# Patient Record
Sex: FEMALE | Race: WHITE | NOT HISPANIC OR LATINO | URBAN - METROPOLITAN AREA
[De-identification: names, ages, dates, MRNs, and addresses within clinical notes are randomized per-mention and may not be internally consistent; named-entity substitution may affect disease eponyms.]

---

## 2023-11-16 DIAGNOSIS — M16.11 PRIMARY OSTEOARTHRITIS OF RIGHT HIP: ICD-10-CM

## 2023-11-16 RX ORDER — MELOXICAM 15 MG/1
15 TABLET ORAL DAILY PRN
COMMUNITY
Start: 2023-08-29 | End: 2023-11-16 | Stop reason: SDUPTHER

## 2023-11-16 RX ORDER — MELOXICAM 15 MG/1
15 TABLET ORAL DAILY PRN
Qty: 90 TABLET | Refills: 1 | Status: SHIPPED | OUTPATIENT
Start: 2023-11-16

## 2023-11-16 RX ORDER — ACETAMINOPHEN, DIPHENHYDRAMINE HCL, PHENYLEPHRINE HCL 325; 25; 5 MG/1; MG/1; MG/1
10 TABLET ORAL NIGHTLY
COMMUNITY

## 2023-11-16 RX ORDER — FLUTICASONE PROPIONATE 50 MCG
1 SPRAY, SUSPENSION (ML) NASAL NIGHTLY
COMMUNITY
Start: 2023-07-25

## 2025-01-08 ENCOUNTER — PREP FOR PROCEDURE (OUTPATIENT)
Dept: ORTHOPEDIC SURGERY | Facility: HOSPITAL | Age: 60
End: 2025-01-08
Payer: COMMERCIAL

## 2025-01-08 DIAGNOSIS — M16.11 PRIMARY OSTEOARTHRITIS OF RIGHT HIP: Primary | ICD-10-CM

## 2025-01-20 ENCOUNTER — PRE-ADMISSION TESTING (OUTPATIENT)
Dept: PREADMISSION TESTING | Facility: HOSPITAL | Age: 60
End: 2025-01-20
Payer: COMMERCIAL

## 2025-01-20 ENCOUNTER — EDUCATION (OUTPATIENT)
Dept: ORTHOPEDIC SURGERY | Facility: HOSPITAL | Age: 60
End: 2025-01-20
Payer: COMMERCIAL

## 2025-01-20 VITALS
SYSTOLIC BLOOD PRESSURE: 135 MMHG | TEMPERATURE: 98.2 F | HEART RATE: 74 BPM | BODY MASS INDEX: 36.14 KG/M2 | WEIGHT: 224.9 LBS | DIASTOLIC BLOOD PRESSURE: 91 MMHG | RESPIRATION RATE: 18 BRPM | HEIGHT: 66 IN | OXYGEN SATURATION: 97 %

## 2025-01-20 DIAGNOSIS — M16.11 PRIMARY OSTEOARTHRITIS OF RIGHT HIP: ICD-10-CM

## 2025-01-20 DIAGNOSIS — Z01.818 PREOP TESTING: Primary | ICD-10-CM

## 2025-01-20 LAB
ALBUMIN SERPL BCP-MCNC: 4.5 G/DL (ref 3.4–5)
ALP SERPL-CCNC: 97 U/L (ref 33–110)
ALT SERPL W P-5'-P-CCNC: 14 U/L (ref 7–45)
ANION GAP SERPL CALC-SCNC: 12 MMOL/L (ref 10–20)
AST SERPL W P-5'-P-CCNC: 17 U/L (ref 9–39)
BASOPHILS # BLD AUTO: 0.08 X10*3/UL (ref 0–0.1)
BASOPHILS NFR BLD AUTO: 1 %
BILIRUB SERPL-MCNC: 0.4 MG/DL (ref 0–1.2)
BUN SERPL-MCNC: 21 MG/DL (ref 6–23)
CALCIUM SERPL-MCNC: 9.8 MG/DL (ref 8.6–10.3)
CHLORIDE SERPL-SCNC: 103 MMOL/L (ref 98–107)
CO2 SERPL-SCNC: 29 MMOL/L (ref 21–32)
CREAT SERPL-MCNC: 0.75 MG/DL (ref 0.5–1.05)
EGFRCR SERPLBLD CKD-EPI 2021: >90 ML/MIN/1.73M*2
EOSINOPHIL # BLD AUTO: 0.34 X10*3/UL (ref 0–0.7)
EOSINOPHIL NFR BLD AUTO: 4.3 %
ERYTHROCYTE [DISTWIDTH] IN BLOOD BY AUTOMATED COUNT: 12.2 % (ref 11.5–14.5)
EST. AVERAGE GLUCOSE BLD GHB EST-MCNC: 97 MG/DL
GLUCOSE SERPL-MCNC: 84 MG/DL (ref 74–99)
HBA1C MFR BLD: 5 %
HCT VFR BLD AUTO: 41.6 % (ref 36–46)
HGB BLD-MCNC: 13.4 G/DL (ref 12–16)
IMM GRANULOCYTES # BLD AUTO: 0.01 X10*3/UL (ref 0–0.7)
IMM GRANULOCYTES NFR BLD AUTO: 0.1 % (ref 0–0.9)
LYMPHOCYTES # BLD AUTO: 2.07 X10*3/UL (ref 1.2–4.8)
LYMPHOCYTES NFR BLD AUTO: 25.9 %
MCH RBC QN AUTO: 29.5 PG (ref 26–34)
MCHC RBC AUTO-ENTMCNC: 32.2 G/DL (ref 32–36)
MCV RBC AUTO: 91 FL (ref 80–100)
MONOCYTES # BLD AUTO: 0.63 X10*3/UL (ref 0.1–1)
MONOCYTES NFR BLD AUTO: 7.9 %
NEUTROPHILS # BLD AUTO: 4.85 X10*3/UL (ref 1.2–7.7)
NEUTROPHILS NFR BLD AUTO: 60.8 %
NRBC BLD-RTO: NORMAL /100{WBCS}
PLATELET # BLD AUTO: 418 X10*3/UL (ref 150–450)
POTASSIUM SERPL-SCNC: 3.7 MMOL/L (ref 3.5–5.3)
PROT SERPL-MCNC: 7.3 G/DL (ref 6.4–8.2)
RBC # BLD AUTO: 4.55 X10*6/UL (ref 4–5.2)
SODIUM SERPL-SCNC: 140 MMOL/L (ref 136–145)
WBC # BLD AUTO: 8 X10*3/UL (ref 4.4–11.3)

## 2025-01-20 PROCEDURE — 93005 ELECTROCARDIOGRAM TRACING: CPT

## 2025-01-20 PROCEDURE — 99204 OFFICE O/P NEW MOD 45 MIN: CPT | Performed by: NURSE PRACTITIONER

## 2025-01-20 PROCEDURE — 36415 COLL VENOUS BLD VENIPUNCTURE: CPT

## 2025-01-20 PROCEDURE — 87081 CULTURE SCREEN ONLY: CPT | Mod: BEALAB

## 2025-01-20 PROCEDURE — 85025 COMPLETE CBC W/AUTO DIFF WBC: CPT

## 2025-01-20 PROCEDURE — 80053 COMPREHEN METABOLIC PANEL: CPT

## 2025-01-20 PROCEDURE — 83036 HEMOGLOBIN GLYCOSYLATED A1C: CPT | Mod: BEALAB

## 2025-01-20 PROCEDURE — 93010 ELECTROCARDIOGRAM REPORT: CPT | Performed by: INTERNAL MEDICINE

## 2025-01-20 RX ORDER — FERROUS SULFATE 325(65) MG
325 TABLET ORAL
COMMUNITY

## 2025-01-20 RX ORDER — MULTIVITAMIN/IRON/FOLIC ACID 18MG-0.4MG
1 TABLET ORAL DAILY
COMMUNITY

## 2025-01-20 RX ORDER — DEXTROMETHORPHAN HYDROBROMIDE, GUAIFENESIN 5; 100 MG/5ML; MG/5ML
650 LIQUID ORAL EVERY 8 HOURS PRN
COMMUNITY

## 2025-01-20 RX ORDER — IBUPROFEN 200 MG
200 TABLET ORAL EVERY 6 HOURS PRN
COMMUNITY

## 2025-01-20 RX ORDER — CHLORHEXIDINE GLUCONATE 40 MG/ML
SOLUTION TOPICAL DAILY
Qty: 470 ML | Refills: 0 | Status: SHIPPED | OUTPATIENT
Start: 2025-01-20 | End: 2025-01-25

## 2025-01-20 RX ORDER — CHLORHEXIDINE GLUCONATE ORAL RINSE 1.2 MG/ML
15 SOLUTION DENTAL DAILY
Qty: 30 ML | Refills: 0 | Status: SHIPPED | OUTPATIENT
Start: 2025-01-20 | End: 2025-01-22

## 2025-01-20 ASSESSMENT — DUKE ACTIVITY SCORE INDEX (DASI)
CAN YOU CLIMB A FLIGHT OF STAIRS OR WALK UP A HILL: NO
CAN YOU RUN A SHORT DISTANCE: NO
CAN YOU DO HEAVY WORK AROUND THE HOUSE LIKE SCRUBBING FLOORS OR LIFTING AND MOVING HEAVY FURNITURE: NO
CAN YOU DO LIGHT WORK AROUND THE HOUSE LIKE DUSTING OR WASHING DISHES: YES
TOTAL_SCORE: 18.7
CAN YOU PARTICIPATE IN MODERATE RECREATIONAL ACTIVITIES LIKE GOLF, BOWLING, DANCING, DOUBLES TENNIS OR THROWING A BASEBALL OR FOOTBALL: NO
CAN YOU PARTICIPATE IN STRENOUS SPORTS LIKE SWIMMING, SINGLES TENNIS, FOOTBALL, BASKETBALL, OR SKIING: NO
CAN YOU DO MODERATE WORK AROUND THE HOUSE LIKE VACUUMING, SWEEPING FLOORS OR CARRYING GROCERIES: YES
CAN YOU HAVE SEXUAL RELATIONS: YES
DASI METS SCORE: 5
CAN YOU DO YARD WORK LIKE RAKING LEAVES, WEEDING OR PUSHING A MOWER: NO
CAN YOU TAKE CARE OF YOURSELF (EAT, DRESS, BATHE, OR USE TOILET): YES
CAN YOU WALK INDOORS, SUCH AS AROUND YOUR HOUSE: YES
CAN YOU WALK A BLOCK OR TWO ON LEVEL GROUND: YES

## 2025-01-20 ASSESSMENT — PAIN - FUNCTIONAL ASSESSMENT: PAIN_FUNCTIONAL_ASSESSMENT: 0-10

## 2025-01-20 ASSESSMENT — PAIN SCALES - GENERAL: PAINLEVEL_OUTOF10: 5 - MODERATE PAIN

## 2025-01-20 NOTE — PREPROCEDURE INSTRUCTIONS
Medication List            Accurate as of January 20, 2025  2:20 PM. Always use your most recent med list.                acetaminophen 650 mg ER tablet  Commonly known as: Tylenol 8 HOUR  Medication Adjustments for Surgery: Take/Use as prescribed     b complex 0.4 mg tablet  Additional Medication Adjustments for Surgery: Take last dose 7 days before surgery  Notes to patient: Last dose preoperatively on 1/26/25     * chlorhexidine 4 % external liquid  Commonly known as: Hibiclens  Apply topically once daily for 5 days.  Medication Adjustments for Surgery: Take/Use as prescribed     * chlorhexidine 0.12 % solution  Commonly known as: Peridex  Use 15 mL in the mouth or throat once daily for 2 doses. Mouthwash, use 15 ml the night before surgery and the morning of surgery. Swish and spit, do not swallow  Medication Adjustments for Surgery: Take/Use as prescribed     COMPLEAT PEPTIDE ORAL  Additional Medication Adjustments for Surgery: Take last dose 7 days before surgery  Notes to patient: Last dose preoperatively on 1/26/25     ferrous sulfate (325 mg ferrous sulfate) tablet  Medication Adjustments for Surgery: Do Not take on the morning of surgery     fluticasone 50 mcg/actuation nasal spray  Commonly known as: Flonase  Medication Adjustments for Surgery: Take/Use as prescribed     ibuprofen 200 mg tablet  Additional Medication Adjustments for Surgery: Take last dose 7 days before surgery  Notes to patient: Last dose preoperatively on 1/26/25     MAGNESIUM GLYCINATE ORAL  Medication Adjustments for Surgery: Do Not take on the morning of surgery     medical cannabis  Additional Medication Adjustments for Surgery: Take last dose 7 days before surgery  Notes to patient: Last dose preoperatively on 1/26/25     meloxicam 15 mg tablet  Commonly known as: Mobic  Take 1 tablet (15 mg) by mouth once daily as needed for mild pain (1 - 3).  Additional Medication Adjustments for Surgery: Take last dose 7 days before  surgery  Notes to patient: Last dose preoperatively on 1/26/25     OMEGA-3 1300 VEGAN ORAL  Additional Medication Adjustments for Surgery: Take last dose 7 days before surgery  Notes to patient: Last dose preoperatively on 1/26/25     VITAMIN D3 ORAL  Additional Medication Adjustments for Surgery: Take last dose 7 days before surgery  Notes to patient: Last dose preoperatively on 1/26/25           * This list has 2 medication(s) that are the same as other medications prescribed for you. Read the directions carefully, and ask your doctor or other care provider to review them with you.                NPO Instructions:     Do not eat any food after midnight the night before your surgery/procedure.  You may have clear liquids until TWO hours before surgery/procedure. This includes water, black tea/coffee, (no milk or cream) apple juice and electrolyte drinks (Gatorade).  You may chew gum up to TWO hours before your surgery/procedure.     Additional Instructions:      Seven/Six Days before Surgery:  Review your medication instructions, stop indicated medications  Five Days before Surgery:  Review your medication instructions, stop indicated medications  Begin using your Hibiclens  Three Days before Surgery:  Review your medication instructions, stop indicated medications  The Day before Surgery:  Start using 0.12% CHG mouthwash  No smoking or alcohol use 24 hours before surgery  Review your medication instructions, stop indicated medications  You will be contacted regarding the time of your arrival to facility and surgery time  Do not eat any food after Midnight  Day of Surgery:  Review your medication instructions, take indicated medications  If you have diabetes, please check your fasting blood sugar upon awakening.  If fasting blood sugar is <80 mg/dl, drink 100 ml of apple juice, time limit of 2 hours before  You may have clear liquids until TWO hours before surgery/procedure.  This includes water, black tea/coffee,  (no milk or cream) apple juice and electrolyte drinks (Gatorade)  You may chew gum up to TWO hours before your surgery/procedure  Wear  comfortable loose fitting clothing  Do not use moisturizers, creams, lotions or perfume  All jewelry and valuables should be left at home     CONTACT SURGEON'S OFFICE IF YOU DEVELOP:  * Fever = 100.4 F   * New respiratory symptoms (e.g. cough, shortness of breath, respiratory distress, sore throat)  * Recent loss of taste or smell  *Flu like symptoms such as headache, fatigue or gastrointestinal symptoms  * You develop any open sores, shingles, burning or painful urination   AND/OR:  * You no longer wish to have the surgery.  * Any other personal circumstances change that may lead to the need to cancel or defer this surgery.  *You were admitted to any hospital within one week of your planned procedure.     SMOKING:  *Quitting smoking can make a huge difference to your health and recovery from surgery.    *If you need help with quitting, call 3-689-QUIT-NOW.     THE DAY BEFORE SURGERY:  *Do not eat any food after midnight the night before your surgery.   *You may have up to TEN OUNCES of clear liquids until TWO hours before your instructed ARRIVAL TIME to hospital. This includes water, black tea/coffee, (no milk or cream) apple juice, clear broth and electrolyte drinks (Gatorade). Please avoid clear liquids that are red in color.   *You may chew gum/mints up to TWO hours before your surgery/procedure.     SURGICAL TIME:  *You will be contacted between 2 p.m. and 3 p.m. the business day before your surgery with your arrival time.  *If you haven't received a call by 3pm, call [phone]  *Scheduled surgery times may change and you will be notified if this occurs-check your personal voicemail for any updates.     ON THE MORNING OF SURGERY:  *Wear comfortable, loose fitting clothing.   *Do not use moisturizers, creams, lotions or perfume.  *All jewelry and valuables should be left at  home.  *Prosthetic devices such as contact lenses, hearing aids, dentures, eyelash extensions, hairpins and body piercing must be removed before surgery.     BRING WITH YOU:  *Photo ID and insurance card  *Current list of medications and allergies  *Pacemaker/Defibrillator/Heart stent cards  *CPAP machine and mask  *Slings/splints/crutches  *Copy of your complete Advanced Directive/DHPOA-if applicable  *Neurostimulator implant remote     PARKING AND ARRIVAL:  *Check in at the Main Entrance desk and let them know you are here for surgery.     IF YOU ARE HAVING OUTPATIENT/SAME DAY SURGERY:  *A responsible adult MUST accompany you at the time of discharge and stay with you for 24 hours after your surgery.  *You may NOT drive yourself home after surgery.  *You may use a taxi or ride sharing service ([employer], Uber) to return home ONLY if you are accompanied by a friend or family member.  *Instructions for resuming your medications will be provided by your surgeon.     Thank you for coming to Pre Admission testing.      If I have prescribed medication please don't forget to  at your pharmacy.      Any questions about today's visit call [phone] and leave a message in the general mailbox.     Patient instructed to ambulate as soon as possible postoperatively to decrease thromboembolic risk.     JW Stovall-CNP     Thank you for visiting [facility].  If you have any changes to your health condition, please call the surgeons office to alert them and give them details of your symptoms.        Preoperative Fasting Guidelines     Why must I stop eating and drinking near surgery time?  With sedation, food or liquid in your stomach can enter your lungs causing serious complications  Increases nausea and vomiting     When do I need to stop eating and drinking before my surgery?  Do not eat any food after midnight the night before your surgery/procedure.  You may have up to TEN ounces of clear  liquid until TWO hours before your instructed arrival time to the hospital.  This includes water, black tea/coffee, (no milk or cream) apple juice, and electrolyte drinks (Gatorade)  You may chew gum until TWO hours before your surgery/procedure        Additional Instructions:      The Day before Surgery:  -Review your medication instructions, stop indicated medications  -You will be contacted in the evening regarding the time of your arrival to facility and surgery time     Day of Surgery:  -Review your medication instructions, take indicated medications  -Wear comfortable loose fitting clothing  -Do not use moisturizers, creams, lotions or perfume  -All jewelry and valuables should be left at home                   Preoperative Brain Exercises     What are brain exercises?  A brain exercise is any activity that engages your thinking (cognitive) skills.     What types of activities are considered brain exercises?  Jigsaw puzzles, crossword puzzles, word jumble, memory games, word search, and many more.  Many can be found free online or on your phone via a mobile anastasiya.     Why should I do brain exercises before my surgery?  More recent research has shown brain exercise before surgery can lower the risk of postoperative delirium (confusion) which can be especially important for older adults.  Patients who did brain exercises for 5 to 10 hours the days before surgery, cut their risk of postoperative delirium in half up to 1 week after surgery.                         [facility]     Preoperative Deep Breathing Exercises     Why it is important to do deep breathing exercises before my surgery?  Deep breathing exercises strengthen your breathing muscles.  This helps you to recover after your surgery and decreases the chance of breathing complications.        How are the deep breathing exercises done?  Sit straight with your back supported.  Breathe in deeply and slowly through your nose. Your  lower rib cage should expand and your abdomen may move forward.  Hold that breath for 3 to 5 seconds.  Breathe out through pursed lips, slowly and completely.  Rest and repeat 10 times every hour while awake.  Rest longer if you become dizzy or lightheaded.                      [facility]     Preoperative Deep Breathing Exercises     Why it is important to do deep breathing exercises before my surgery?  Deep breathing exercises strengthen your breathing muscles.  This helps you to recover after your surgery and decreases the chance of breathing complications.        How are the deep breathing exercises done?  Sit straight with your back supported.  Breathe in deeply and slowly through your nose. Your lower rib cage should expand and your abdomen may move forward.  Hold that breath for 3 to 5 seconds.  Breathe out through pursed lips, slowly and completely.  Rest and repeat 10 times every hour while awake.  Rest longer if you become dizzy or lightheaded.        Patient Information: Incentive Spirometer  What is an incentive spirometer?  An incentive spirometer is a device used before and after surgery to “exercise” your lungs.  It helps you to take deeper breaths to expand your lungs.  Below is an example of a basic incentive spirometer.  The device you receive may differ slightly but they all function the same.    Why do I need to use an incentive spirometer?  Using your incentive spirometer prepares your lungs for surgery and helps prevent lung problems after surgery.  How do I use my incentive spirometer?  When you're using your incentive spirometer, make sure to breathe through your mouth. If you breathe through your nose, the incentive spirometer won't work properly. You can hold your nose if you have trouble.  If you feel dizzy at any time, stop and rest. Try again at a later time.  Follow the steps below:  Set up your incentive spirometer, expand the flexible tubing and connect to  the outlet.  Sit upright in a chair or bed. Hold the incentive spirometer at eye level.   Put the mouthpiece in your mouth and close your lips tightly around it. Slowly breathe out (exhale) completely.  Breathe in (inhale) slowly through your mouth as deeply as you can. As you take a breath, you will see the piston rise inside the large column. While the piston rises, the indicator should move upwards. It should stay in between the 2 arrows (see Figure).  Try to get the piston as high as you can, while keeping the indicator between the arrows.   If the indicator doesn't stay between the arrows, you're breathing either too fast or too slow.  When you get it as high as you can, hold your breath for 10 seconds, or as long as possible. While you're holding your breath, the piston will slowly fall to the base of the spirometer.  Once the piston reaches the bottom of the spirometer, breathe out slowly through your mouth. Rest for a few seconds.  Repeat 10 times. Try to get the piston to the same level with each breath.  Repeat every hour while awake  You can carefully clean the outside of the mouthpiece with an alcohol wipe or soap and water.       Patient and Family Education             Ways You Can Help Prevent Blood Clots                    This handout explains some simple things you can do to help prevent blood clots.      Blood clots are blockages that can form in the body's veins. When a blood clot forms in your deep veins, it may be called a deep vein thrombosis, or DVT for short. Blood clots can happen in any part of the body where blood flows, but they are most common in the arms and legs. If a piece of a blood clot breaks free and travels to the lungs, it is called a pulmonary embolus (PE). A PE can be a very serious problem.         Being in the hospital or having surgery can raise your chances of getting a blood clot because you may not be well enough to move around as much as you normally do.         Ways  you can help prevent blood clots in the hospital           Wearing SCDs. SCDs stands for Sequential Compression Devices.   SCDs are special sleeves that wrap around your legs  They attach to a pump that fills them with air to gently squeeze your legs every few minutes.   This helps return the blood in your legs to your heart.   SCDs should only be taken off when walking or bathing.   SCDs may not be comfortable, but they can help save your life.                                            Wearing compression stockings - if your doctor orders them. These special snug fitting stockings gently squeeze your legs to help blood flow.       Walking. Walking helps move the blood in your legs.   If your doctor says it is ok, try walking the halls at least   5 times a day. Ask us to help you get up, so you don't fall.      Taking any blood thinning medicines your doctor orders.        Page 1 of 2            [facility]; 3/23   Ways you can help prevent blood clots at home         Wearing compression stockings - if your doctor orders them. ? Walking - to help move the blood in your legs.       Taking any blood thinning medicines your doctor orders.      Signs of a blood clot or PE        Tell your doctor or nurse know right away if you have of the problems listed below.    If you are at home, seek medical care right away. Call 911 for chest pain or problems breathing.                Signs of a blood clot (DVT) - such as pain,  swelling, redness or warmth in your arm or leg      Signs of a pulmonary embolism (PE) - such as chest pain or feeling short of breath    Patient Information: Pre-Operative Infection Prevention Measures     Why did I have my nose, under my arms, and groin swabbed?  The purpose of the swab is to identify Staphylococcus aureus inside your nose or on your skin.  The swab was sent to the laboratory for culture.  A positive swab/culture for Staphylococcus aureus is called colonization or carriage.       What is Staphylococcus aureus?  Staphylococcus aureus, also known as “staph”, is a germ found on the skin or in the nose of healthy people.  Sometimes Staphylococcus aureus can get into the body and cause an infection.  This can be minor (such as pimples, boils, or other skin problems).  It might also be serious (such as a blood infection, pneumonia, or a surgical site infection).    What is Staphylococcus aureus colonization or carriage?  Colonization or carriage means that a person has the germ but is not sick from it.  These bacteria can be spread on the hands or when breathing or sneezing.    How is Staphylococcus aureus spread?  It is most often spread by close contact with a person or item that carries it.    What happens if my culture is positive for Staphylococcus aureus?  Your doctor/medical team will use this information to guide any antibiotic treatment which may be necessary.  Regardless of the culture results, we will clean the inside of your nose with a betadine swab just before you have your surgery.      Will I get an infection if I have Staphylococcus aureus in my nose or on my skin?  Anyone can get an infection with Staphylococcus aureus.  However, the best way to reduce your risk of infection is to follow the instructions provided to you for the use of your CHG soap and dental rinse.        Patient Information: Oral/Dental Rinse    What is oral/dental rinse?   It is a mouthwash. It is a way of cleaning the mouth with a germ-killing solution before your surgery.  The solution contains chlorhexidine, commonly known as CHG.   It is used inside the mouth to kill a bacteria known as Staphylococcus aureus.  Let your doctor know if you are allergic to Chlorhexidine.    We have sent a prescription for CHG 0.12% mouthwash to your preferred pharmacy.  If you have not already, Please  your prescription and start using the day before before surgery.  Follow the instruction sheet provided to you at  your CPM/PAT appointment. Please contact [facility] if you do not receive your CHG mouthwash prescription.     Why do I need to use CHG oral/dental rinse?  The CHG oral/dental rinse helps to kill a bacteria in your mouth known as Staphylococcus aureus.     This reduces the risk of infection at the surgical site.      Using your CHG oral/dental rinse  STEPS:  Use your CHG oral/dental rinse after you brush your teeth the night before (at bedtime) and the morning of your surgery.  Follow all directions on your prescription label.    Use the cap on the container to measure 15ml   Swish (gargle if you can) the mouthwash in your mouth for at least 30 seconds, (do not swallow) and spit out  After you use your CHG rinse, do not rinse your mouth with water, drink or eat.  Please refer to the prescription label for the appropriate time to resume oral intake      What side effects might I have using the CHG oral/dental rinse?  CHG rinse will stick to plaque on the teeth.  Brush and floss just before use.  Teeth brushing will help avoid staining of plaque during use.      Patient Information: Home Preoperative Antibacterial Shower      What is a home preoperative antibacterial shower?  This shower is a way of cleaning the skin with a germ-killing solution before surgery.  The solution contains chlorhexidine, commonly known as CHG.  CHG is a skin cleanser with germ-killing ability.  Let your doctor know if you are allergic to chlorhexidine.    Why do I need to take a preoperative antibacterial shower?  Skin is not sterile.  It is best to try to make your skin as free of germs as possible before surgery.  Proper cleansing with a germ-killing soap before surgery can lower the number of germs on your skin.  This helps to reduce the risk of infection at the surgical site.  Following the instructions listed below will help you prepare your skin for surgery.      How do I use the solution?  Steps:  Begin using your CHG soap 5 days before  your scheduled surgery on 1/30/25  First, wash and rinse your hair using the CHG soap. Keep CHG soap away from ear canals and eyes.  Rinse completely, do not condition.  Hair extensions should be removed.  Wash your face with your normal soap and rinse.    Apply the CHG solution to a clean wet washcloth.  Turn the water off or move away from the water spray to avoid premature rinsing of the CHG soap as you are applying.   Firmly lather your entire body from the neck down.  Do not use on your face.  Pay special attention to the area(s) where your incision(s) will be located unless they are on your face.  Avoid scrubbing your skin too hard.  The important point is to have the CHG soap sit on your skin for 3 minutes.    When the 3 minutes are up, turn on the water and rinse the CHG solution off your body completely.   DO NOT wash with regular soap after you have used the CHG soap solution  Pat yourself dry with a clean, freshly-laundered towel.  DO NOT apply powders, deodorants, or lotions.  Dress in clean, freshly laundered nightclothes.    Be sure to sleep with clean, freshly laundered sheets.  Be aware that CHG will cause stains on fabrics; if you wash them with bleach after use.  Rinse your washcloth and other linens that have contact with CHG completely.  Use only non-chlorine detergents to launder the items used.   The morning of surgery is the fifth day.  Repeat the above steps and dress in clean comfortable clothing     Whom should I contact if I have any questions regarding the use of CHG soap?  Call the [facility] at [phone] if you have any questions.

## 2025-01-20 NOTE — H&P (VIEW-ONLY)
CPM/PAT Evaluation       Name: Gabriela Sanford (Gabriela Sanford)  /Age: 1965/59 y.o.     In-Person       Chief Complaint: Primary osteoarthritis of right hip     HPI  Patient is an alert and oriented 59 year old female scheduled for a  ARTHROPLASTY, HIP, TOTAL, POSTERIOR APPROACH on 2/3/25 with Dr. Esposito for  Primary osteoarthritis of right hip . PMHX includes hasimoto's thyroiditis, systolic murmur. Presents to [facility] PAT today for perioperative risk stratification and optimization.       No past medical history on file.    No past surgical history on file.    Patient  has no history on file for sexual activity.    No family history on file.    Allergies   Allergen Reactions    Ampicillin Unknown and GI Upset    Codeine Hives    Cortisone Unknown    Formaldehyde Swelling    Latex Swelling    Penicillins GI Upset       Prior to Admission medications    Medication Sig Start Date End Date Taking? Authorizing Provider   fluticasone (Flonase) 50 mcg/actuation nasal spray Administer 1 spray into each nostril once daily at bedtime. 23   Historical Provider, MD   melatonin 10 mg tablet Take 1 tablet (10 mg) by mouth once daily at bedtime.    Historical Provider, MD   meloxicam (Mobic) 15 mg tablet Take 1 tablet (15 mg) by mouth once daily as needed for mild pain (1 - 3). 23   Elias Conklin MD       Review of Systems   Constitutional: Negative for chills, decreased appetite, diaphoresis, fever and malaise/fatigue.   Eyes:  Negative for blurred vision and double vision.   Cardiovascular:  Negative for chest pain, claudication, cyanosis, dyspnea on exertion, irregular heartbeat, leg swelling, near-syncope and palpitations.   Respiratory:  Negative for cough, hemoptysis, shortness of breath and wheezing.    Endocrine: Negative for cold intolerance, heat intolerance, polydipsia, polyphagia and polyuria.   Gastrointestinal:  Negative for abdominal pain, constipation, diarrhea, dysphagia, nausea and vomiting.    Genitourinary:  Negative for bladder incontinence, dysuria, hematuria, incomplete emptying, nocturia, frequency, pelvic pain and urgency.   Neurological:  Negative for headaches, light-headedness, paresthesias, sensory change and weakness.   Psychiatric/Behavioral:  Negative for altered mental status.    Musculoskeletal: Postive for myalgias, arthralgias     Vitals and nursing note reviewed.     Physical exam  Constitutional:       Appearance: Normal appearance. She is Obese.   HENT:      Head: Normocephalic and atraumatic.      Mouth/Throat:      Mouth: Mucous membranes are moist.      Pharynx: Oropharynx is clear.   Eyes:      Extraocular Movements: Extraocular movements intact.      Conjunctiva/sclera: Conjunctivae normal.      Pupils: Pupils are equal, round, and reactive to light.   Cardiovascular:      PMI at left midclavicular line. Normal rate. Regular rhythm. Normal S1. Normal S2.       Murmurs: There is no murmur.      No gallop.  No click. No rub.       No audible carotid bruit     No lower extremity edema on exam  Pulmonary:      Effort: Pulmonary effort is normal.      Breath sounds: Normal breath sounds.   Abdominal:      General: Abdomen is flat. Bowel sounds are normal.      Palpations: Abdomen is soft and non-tender  Musculoskeletal:      Cervical back: Normal range of motion and neck supple.   Skin:     General: Skin is warm and dry.      Capillary Refill: Capillary refill takes less than 2 seconds.   Neurological:      General: No focal deficit present.      Mental Status: She is alert and oriented to person, place, and time. Mental status is at baseline.   Psychiatric:         Mood and Affect: Mood normal.         Behavior: Behavior normal.         Thought Content: Thought content normal.         Judgment: Judgment normal.     Vitals and nursing note reviewed. Physical exam within normal limits.   DASI Risk Score      Flowsheet Row Pre-Admission Testing from 1/20/2025 in [facility]  Center   Can you take care of yourself (eat, dress, bathe, or use toilet)?  2.75 filed at 01/20/2025 1454   Can you walk indoors, such as around your house? 1.75 filed at 01/20/2025 1454   Can you walk a block or two on level ground?  2.75 filed at 01/20/2025 1454   Can you climb a flight of stairs or walk up a hill? 0 filed at 01/20/2025 1454   Can you run a short distance? 0 filed at 01/20/2025 1454   Can you do light work around the house like dusting or washing dishes? 2.7 filed at 01/20/2025 1454   Can you do moderate work around the house like vacuuming, sweeping floors or carrying groceries? 3.5 filed at 01/20/2025 1454   Can you do heavy work around the house like scrubbing floors or lifting and moving heavy furniture?  0 filed at 01/20/2025 1454   Can you do yard work like raking leaves, weeding or pushing a mower? 0 filed at 01/20/2025 1454   Can you have sexual relations? 5.25 filed at 01/20/2025 1454   Can you participate in moderate recreational activities like golf, bowling, dancing, doubles tennis or throwing a baseball or football? 0 filed at 01/20/2025 1454   Can you participate in strenous sports like swimming, singles tennis, football, basketball, or skiing? 0 filed at 01/20/2025 1454   DASI SCORE 18.7 filed at 01/20/2025 1454   METS Score (Will be calculated only when all the questions are answered) 5 filed at 01/20/2025 1454          Capgabriellei DVT Assessment      Flowsheet Row Pre-Admission Testing from 1/20/2025 in [facility]   DVT Score (IF A SCORE IS NOT CALCULATING, MUST SELECT A BMI TO COMPLETE) 9 filed at 01/20/2025 1451   Medical Factors Family history of DVT/PE filed at 01/20/2025 1451   Surgical Factors Major surgery planned, including arthroscopic and laproscopic (1-2 hours) filed at 01/20/2025 1451   BMI (BMI MUST BE CHOSEN) 31-40 (Obesity) filed at 01/20/2025 1451          Modified Frailty Index    No data to display       CHADS2 Stroke Risk  Current as of 50 minutes  ago        N/A 3 to 100%: High Risk   2 to < 3%: Medium Risk   0 to < 2%: Low Risk     Last Change: N/A          This score determines the patient's risk of having a stroke if the patient has atrial fibrillation.        This score is not applicable to this patient. Components are not calculated.          Revised Cardiac Risk Index      Flowsheet Row Pre-Admission Testing from 1/20/2025 in [facility]   High-Risk Surgery (Intraperitoneal, Intrathoracic,Suprainguinal vascular) 0 filed at 01/20/2025 1454   History of ischemic heart disease (History of MI, History of positive exercuse test, Current chest paint considered due to myocardial ischemia, Use of nitrate therapy, ECG with pathological Q Waves) 0 filed at 01/20/2025 1454   History of congestive heart failure (pulmonary edemia, bilateral rales or S3 gallop, Paroxysmal nocturnal dyspnea, CXR showing pulmonary vascular redistribution) 0 filed at 01/20/2025 1454   History of cerebrovascular disease (Prior TIA or stroke) 0 filed at 01/20/2025 1454   Pre-operative insulin treatment 0 filed at 01/20/2025 1454   Pre-operative creatinine>2 mg/dl 0 filed at 01/20/2025 1454   Revised Cardiac Risk Calculator 0 filed at 01/20/2025 1454          Apfel Simplified Score    No data to display       Risk Analysis Index Results This Encounter    No data found in the last 10 encounters.       Stop Bang Score      Flowsheet Row Pre-Admission Testing from 1/20/2025 in [facility]   Do you snore loudly? 0 filed at 01/20/2025 1403   Do you often feel tired or fatigued after your sleep? 1 filed at 01/20/2025 1403   Has anyone ever observed you stop breathing in your sleep? 0 filed at 01/20/2025 1403   Do you have or are you being treated for high blood pressure? 0 filed at 01/20/2025 1403   Recent BMI (Calculated) 36.3 filed at 01/20/2025 1403   Is BMI greater than 35 kg/m2? 1=Yes filed at 01/20/2025 1403   Age older than 50 years old? 1=Yes filed at  01/20/2025 1403   Is your neck circumference greater than 17 inches (Male) or 16 inches (Female)? 0 filed at 01/20/2025 1403   Gender - Male 0=No filed at 01/20/2025 1403   STOP-BANG Total Score 3 filed at 01/20/2025 1403          Prodigy: High Risk  Total Score: 0          ARISCAT Score for Postoperative Pulmonary Complications      Flowsheet Row Pre-Admission Testing from 1/20/2025 in [facility]   Age Calculated Score 3 filed at 01/20/2025 1455   Preoperative SpO2 0 filed at 01/20/2025 1455   Respiratory infection in the last month Either upper or lower (i.e., URI, bronchitis, pneumonia), with fever and antibiotic treatment 0 filed at 01/20/2025 1455   Preoperative anemia (Hgb less than 10 g/dl) 0 filed at 01/20/2025 1455   Surgical incision  0 filed at 01/20/2025 1455   Duration of surgery  16 filed at 01/20/2025 1455   Emergency Procedure  0 filed at 01/20/2025 1455   ARISCAT Total Score  19 filed at 01/20/2025 1455          Vu Perioperative Risk for Myocardial Infarction or Cardiac Arrest (BEHZAD)      Flowsheet Row Pre-Admission Testing from 1/20/2025 in [facility]   Calculated Age Score 1.18 filed at 01/20/2025 1455   Functional Status  0 filed at 01/20/2025 1455   ASA Class  -3.29 filed at 01/20/2025 1455   Creatinine 0 filed at 01/20/2025 1455   Type of Procedure  0.80 filed at 01/20/2025 1455   BEHZAD Total Score  -6.56 filed at 01/20/2025 1455   BEHZAD % 0.14 filed at 01/20/2025 1455            Assessment & Plan:    Neuro:  No diagnosis or significant findings on chart review or clinical presentation and evaluation.     HEENT/Airway:  No diagnosis or significant findings on chart review or clinical presentation and evaluation.   STOP-BANG Score- 3 points moderate risk for OLGA LIDIA    Mallampati::  II    TM distance::  >3 FB    Neck ROM::  Full  Dentures-denies  Crowns-reports  Implants-denies    Cardiovascular:  No diagnosis or significant findings on chart review or clinical  presentation and evaluation.   METS: 5  RCRI: 0 points, 3.9%  risk for postoperative MACE   BEHZAD: 0.14% risk for postoperative MACE  EKG -normal sinus rhythm Rate- No acute changes.   ECHO 2022 pt had a murmur after having COVID  The left ventricle is normal in size. There is no left ventricular hypertrophy.   Left ventricular systolic function is normal. EF = 57 ± 5% (2D biplane) Grade I   left ventricular diastolic dysfunction.   - The right ventricle is normal in size. Right ventricular systolic function is   normal.   - The left atrial cavity is mildly dilated.   - There are no significant valvular abnormalities.   - The patient has not had a prior CC echocardiographic exam for comparison.     Pulmonary:  No diagnosis or significant findings on chart review or clinical presentation and evaluation.   ARISCAT: <26 points, 1.6% risk of in-hospital postoperative pulmonary complication  PRODIGY: Low risk for opioid induced respiratory depression  Smoking History-She has never smoked.  Discussed smoking cessation and deep breathing handout given    Renal/Urinary:    No diagnosis or significant findings on chart review or clinical presentation and evaluation, however, the patient is at increased risk of perioperative renal complications secondary to age>/= 56. Preventative measures include BP monitoring, medication compliance, and hydration management.   CMP-Pending  Creatinine-  GFR-    Endocrine:  Hashimoto's thyroiditis   [id number]    Hematologic/Immunology:  No diagnosis or significant findings on chart review or clinical presentation and evaluation.  The patient is not a Jehovah’s witness and will accept blood and blood products if medically indicated.   History of previous blood transfusions No  CBC-Pending  HGB-Pending  Caprini Score 9, patient at High for postoperative DVT. Pt supplied education/VTE handout  Anticoagulation use: No   Her mother had a clot after surgery many years   Gastrointestinal:   No  diagnosis or significant findings on chart review or clinical presentation and evaluation.   Recreational drug use:  cbd gummies to sleep   Alcohol use  rare use     Infectious disease:   No diagnosis or significant findings on chart review or clinical presentation and evaluation.   Prescription provided for CHG body wash and dental rinse. CHG use instructions reviewed and provided to patient.  Staph screen collected-Pending    Musculoskeletal:   OA right hip (meloxicam) pt using a cane for assistance   JHFRAT score- 5 points. low risk for falls    Anesthesia:  ASA 2 - Patient with mild systemic disease with no functional limitations  Anticipated anesthesia-General  History of General anesthesia- yes  Complications- pt has not had surgery since she was 18  No family history of anesthesia complications  Pt does not want a spinal     Abnormalities noted on PAT evaluation: No    Labs & Imaging ordered:  MRSA, EKG, CBC, CMP, A1C  Nickel/metal allergy-negative  Shellfish allergy-negative    Discussed with patient medication instructions, NPO guidelines, and any questions or concerns.      time spent 45 minutes

## 2025-01-20 NOTE — CPM/PAT H&P
CPM/PAT Evaluation       Name: Gabriela Sanford (Gabriela Sanford)  /Age: 1965/59 y.o.     In-Person       Chief Complaint: Primary osteoarthritis of right hip     HPI  Patient is an alert and oriented 59 year old female scheduled for a  ARTHROPLASTY, HIP, TOTAL, POSTERIOR APPROACH on 2/3/25 with Dr. Esposito for  Primary osteoarthritis of right hip . PMHX includes hasimoto's thyroiditis, systolic murmur. Presents to [facility] PAT today for perioperative risk stratification and optimization.       No past medical history on file.    No past surgical history on file.    Patient  has no history on file for sexual activity.    No family history on file.    Allergies   Allergen Reactions    Ampicillin Unknown and GI Upset    Codeine Hives    Cortisone Unknown    Formaldehyde Swelling    Latex Swelling    Penicillins GI Upset       Prior to Admission medications    Medication Sig Start Date End Date Taking? Authorizing Provider   fluticasone (Flonase) 50 mcg/actuation nasal spray Administer 1 spray into each nostril once daily at bedtime. 23   Historical Provider, MD   melatonin 10 mg tablet Take 1 tablet (10 mg) by mouth once daily at bedtime.    Historical Provider, MD   meloxicam (Mobic) 15 mg tablet Take 1 tablet (15 mg) by mouth once daily as needed for mild pain (1 - 3). 23   Elias Conklin MD       Review of Systems   Constitutional: Negative for chills, decreased appetite, diaphoresis, fever and malaise/fatigue.   Eyes:  Negative for blurred vision and double vision.   Cardiovascular:  Negative for chest pain, claudication, cyanosis, dyspnea on exertion, irregular heartbeat, leg swelling, near-syncope and palpitations.   Respiratory:  Negative for cough, hemoptysis, shortness of breath and wheezing.    Endocrine: Negative for cold intolerance, heat intolerance, polydipsia, polyphagia and polyuria.   Gastrointestinal:  Negative for abdominal pain, constipation, diarrhea, dysphagia, nausea and vomiting.    Genitourinary:  Negative for bladder incontinence, dysuria, hematuria, incomplete emptying, nocturia, frequency, pelvic pain and urgency.   Neurological:  Negative for headaches, light-headedness, paresthesias, sensory change and weakness.   Psychiatric/Behavioral:  Negative for altered mental status.    Musculoskeletal: Postive for myalgias, arthralgias     Vitals and nursing note reviewed.     Physical exam  Constitutional:       Appearance: Normal appearance. She is Obese.   HENT:      Head: Normocephalic and atraumatic.      Mouth/Throat:      Mouth: Mucous membranes are moist.      Pharynx: Oropharynx is clear.   Eyes:      Extraocular Movements: Extraocular movements intact.      Conjunctiva/sclera: Conjunctivae normal.      Pupils: Pupils are equal, round, and reactive to light.   Cardiovascular:      PMI at left midclavicular line. Normal rate. Regular rhythm. Normal S1. Normal S2.       Murmurs: There is no murmur.      No gallop.  No click. No rub.       No audible carotid bruit     No lower extremity edema on exam  Pulmonary:      Effort: Pulmonary effort is normal.      Breath sounds: Normal breath sounds.   Abdominal:      General: Abdomen is flat. Bowel sounds are normal.      Palpations: Abdomen is soft and non-tender  Musculoskeletal:      Cervical back: Normal range of motion and neck supple.   Skin:     General: Skin is warm and dry.      Capillary Refill: Capillary refill takes less than 2 seconds.   Neurological:      General: No focal deficit present.      Mental Status: She is alert and oriented to person, place, and time. Mental status is at baseline.   Psychiatric:         Mood and Affect: Mood normal.         Behavior: Behavior normal.         Thought Content: Thought content normal.         Judgment: Judgment normal.     Vitals and nursing note reviewed. Physical exam within normal limits.   DASI Risk Score      Flowsheet Row Pre-Admission Testing from 1/20/2025 in [facility]   Can you take care of yourself (eat, dress, bathe, or use toilet)?  2.75 filed at 01/20/2025 1454   Can you walk indoors, such as around your house? 1.75 filed at 01/20/2025 1454   Can you walk a block or two on level ground?  2.75 filed at 01/20/2025 1454   Can you climb a flight of stairs or walk up a hill? 0 filed at 01/20/2025 1454   Can you run a short distance? 0 filed at 01/20/2025 1454   Can you do light work around the house like dusting or washing dishes? 2.7 filed at 01/20/2025 1454   Can you do moderate work around the house like vacuuming, sweeping floors or carrying groceries? 3.5 filed at 01/20/2025 1454   Can you do heavy work around the house like scrubbing floors or lifting and moving heavy furniture?  0 filed at 01/20/2025 1454   Can you do yard work like raking leaves, weeding or pushing a mower? 0 filed at 01/20/2025 1454   Can you have sexual relations? 5.25 filed at 01/20/2025 1454   Can you participate in moderate recreational activities like golf, bowling, dancing, doubles tennis or throwing a baseball or football? 0 filed at 01/20/2025 1454   Can you participate in strenous sports like swimming, singles tennis, football, basketball, or skiing? 0 filed at 01/20/2025 1454   DASI SCORE 18.7 filed at 01/20/2025 1454   METS Score (Will be calculated only when all the questions are answered) 5 filed at 01/20/2025 1454          Capgabriellei DVT Assessment      Flowsheet Row Pre-Admission Testing from 1/20/2025 in [facility]   DVT Score (IF A SCORE IS NOT CALCULATING, MUST SELECT A BMI TO COMPLETE) 9 filed at 01/20/2025 1451   Medical Factors Family history of DVT/PE filed at 01/20/2025 1451   Surgical Factors Major surgery planned, including arthroscopic and laproscopic (1-2 hours) filed at 01/20/2025 1451   BMI (BMI MUST BE CHOSEN) 31-40 (Obesity) filed at 01/20/2025 1451          Modified Frailty Index    No data to display       CHADS2 Stroke Risk  Current as of 50 minutes  ago        N/A 3 to 100%: High Risk   2 to < 3%: Medium Risk   0 to < 2%: Low Risk     Last Change: N/A          This score determines the patient's risk of having a stroke if the patient has atrial fibrillation.        This score is not applicable to this patient. Components are not calculated.          Revised Cardiac Risk Index      Flowsheet Row Pre-Admission Testing from 1/20/2025 in [facility]   High-Risk Surgery (Intraperitoneal, Intrathoracic,Suprainguinal vascular) 0 filed at 01/20/2025 1454   History of ischemic heart disease (History of MI, History of positive exercuse test, Current chest paint considered due to myocardial ischemia, Use of nitrate therapy, ECG with pathological Q Waves) 0 filed at 01/20/2025 1454   History of congestive heart failure (pulmonary edemia, bilateral rales or S3 gallop, Paroxysmal nocturnal dyspnea, CXR showing pulmonary vascular redistribution) 0 filed at 01/20/2025 1454   History of cerebrovascular disease (Prior TIA or stroke) 0 filed at 01/20/2025 1454   Pre-operative insulin treatment 0 filed at 01/20/2025 1454   Pre-operative creatinine>2 mg/dl 0 filed at 01/20/2025 1454   Revised Cardiac Risk Calculator 0 filed at 01/20/2025 1454          Apfel Simplified Score    No data to display       Risk Analysis Index Results This Encounter    No data found in the last 10 encounters.       Stop Bang Score      Flowsheet Row Pre-Admission Testing from 1/20/2025 in [facility]   Do you snore loudly? 0 filed at 01/20/2025 1403   Do you often feel tired or fatigued after your sleep? 1 filed at 01/20/2025 1403   Has anyone ever observed you stop breathing in your sleep? 0 filed at 01/20/2025 1403   Do you have or are you being treated for high blood pressure? 0 filed at 01/20/2025 1403   Recent BMI (Calculated) 36.3 filed at 01/20/2025 1403   Is BMI greater than 35 kg/m2? 1=Yes filed at 01/20/2025 1403   Age older than 50 years old? 1=Yes filed at  01/20/2025 1403   Is your neck circumference greater than 17 inches (Male) or 16 inches (Female)? 0 filed at 01/20/2025 1403   Gender - Male 0=No filed at 01/20/2025 1403   STOP-BANG Total Score 3 filed at 01/20/2025 1403          Prodigy: High Risk  Total Score: 0          ARISCAT Score for Postoperative Pulmonary Complications      Flowsheet Row Pre-Admission Testing from 1/20/2025 in [facility]   Age Calculated Score 3 filed at 01/20/2025 1455   Preoperative SpO2 0 filed at 01/20/2025 1455   Respiratory infection in the last month Either upper or lower (i.e., URI, bronchitis, pneumonia), with fever and antibiotic treatment 0 filed at 01/20/2025 1455   Preoperative anemia (Hgb less than 10 g/dl) 0 filed at 01/20/2025 1455   Surgical incision  0 filed at 01/20/2025 1455   Duration of surgery  16 filed at 01/20/2025 1455   Emergency Procedure  0 filed at 01/20/2025 1455   ARISCAT Total Score  19 filed at 01/20/2025 1455          Vu Perioperative Risk for Myocardial Infarction or Cardiac Arrest (BEHZAD)      Flowsheet Row Pre-Admission Testing from 1/20/2025 in [facility]   Calculated Age Score 1.18 filed at 01/20/2025 1455   Functional Status  0 filed at 01/20/2025 1455   ASA Class  -3.29 filed at 01/20/2025 1455   Creatinine 0 filed at 01/20/2025 1455   Type of Procedure  0.80 filed at 01/20/2025 1455   BEHZAD Total Score  -6.56 filed at 01/20/2025 1455   BEHZAD % 0.14 filed at 01/20/2025 1455            Assessment & Plan:    Neuro:  No diagnosis or significant findings on chart review or clinical presentation and evaluation.     HEENT/Airway:  No diagnosis or significant findings on chart review or clinical presentation and evaluation.   STOP-BANG Score- 3 points moderate risk for OLGA LIDIA    Mallampati::  II    TM distance::  >3 FB    Neck ROM::  Full  Dentures-denies  Crowns-reports  Implants-denies    Cardiovascular:  No diagnosis or significant findings on chart review or clinical  presentation and evaluation.   METS: 5  RCRI: 0 points, 3.9%  risk for postoperative MACE   BEHZAD: 0.14% risk for postoperative MACE  EKG -normal sinus rhythm Rate- No acute changes.   ECHO 2022 pt had a murmur after having COVID  The left ventricle is normal in size. There is no left ventricular hypertrophy.   Left ventricular systolic function is normal. EF = 57 ± 5% (2D biplane) Grade I   left ventricular diastolic dysfunction.   - The right ventricle is normal in size. Right ventricular systolic function is   normal.   - The left atrial cavity is mildly dilated.   - There are no significant valvular abnormalities.   - The patient has not had a prior CC echocardiographic exam for comparison.     Pulmonary:  No diagnosis or significant findings on chart review or clinical presentation and evaluation.   ARISCAT: <26 points, 1.6% risk of in-hospital postoperative pulmonary complication  PRODIGY: Low risk for opioid induced respiratory depression  Smoking History-She has never smoked.  Discussed smoking cessation and deep breathing handout given    Renal/Urinary:    No diagnosis or significant findings on chart review or clinical presentation and evaluation, however, the patient is at increased risk of perioperative renal complications secondary to age>/= 56. Preventative measures include BP monitoring, medication compliance, and hydration management.   CMP-Pending  Creatinine-  GFR-    Endocrine:  Hashimoto's thyroiditis   [id number]    Hematologic/Immunology:  No diagnosis or significant findings on chart review or clinical presentation and evaluation.  The patient is not a Jehovah’s witness and will accept blood and blood products if medically indicated.   History of previous blood transfusions No  CBC-Pending  HGB-Pending  Caprini Score 9, patient at High for postoperative DVT. Pt supplied education/VTE handout  Anticoagulation use: No   Her mother had a clot after surgery many years   Gastrointestinal:   No  diagnosis or significant findings on chart review or clinical presentation and evaluation.   Recreational drug use:  cbd gummies to sleep   Alcohol use  rare use     Infectious disease:   No diagnosis or significant findings on chart review or clinical presentation and evaluation.   Prescription provided for CHG body wash and dental rinse. CHG use instructions reviewed and provided to patient.  Staph screen collected-Pending    Musculoskeletal:   OA right hip (meloxicam) pt using a cane for assistance   JHFRAT score- 5 points. low risk for falls    Anesthesia:  ASA 2 - Patient with mild systemic disease with no functional limitations  Anticipated anesthesia-General  History of General anesthesia- yes  Complications- pt has not had surgery since she was 18  No family history of anesthesia complications  Pt does not want a spinal     Abnormalities noted on PAT evaluation: No    Labs & Imaging ordered:  MRSA, EKG, CBC, CMP, A1C  Nickel/metal allergy-negative  Shellfish allergy-negative    Discussed with patient medication instructions, NPO guidelines, and any questions or concerns.      time spent 45 minutes

## 2025-01-21 LAB
ATRIAL RATE: 68 BPM
P AXIS: 25 DEGREES
P OFFSET: 200 MS
P ONSET: 146 MS
PR INTERVAL: 146 MS
Q ONSET: 219 MS
QRS COUNT: 11 BEATS
QRS DURATION: 80 MS
QT INTERVAL: 398 MS
QTC CALCULATION(BAZETT): 423 MS
QTC FREDERICIA: 415 MS
R AXIS: -2 DEGREES
T AXIS: 11 DEGREES
T OFFSET: 418 MS
VENTRICULAR RATE: 68 BPM

## 2025-01-22 LAB — STAPHYLOCOCCUS SPEC CULT: NORMAL

## 2025-01-28 PROBLEM — M16.11 PRIMARY OSTEOARTHRITIS OF RIGHT HIP: Status: ACTIVE | Noted: 2025-01-28

## 2025-01-28 RX ORDER — TRANEXAMIC ACID 100 MG/ML
1000 INJECTION, SOLUTION INTRAVENOUS 2 TIMES DAILY
Status: CANCELLED | OUTPATIENT
Start: 2025-02-03 | End: 2025-02-04

## 2025-01-31 ENCOUNTER — ANESTHESIA EVENT (OUTPATIENT)
Dept: OPERATING ROOM | Facility: HOSPITAL | Age: 60
End: 2025-01-31
Payer: COMMERCIAL

## 2025-02-03 ENCOUNTER — PHARMACY VISIT (OUTPATIENT)
Dept: PHARMACY | Facility: CLINIC | Age: 60
End: 2025-02-03
Payer: COMMERCIAL

## 2025-02-03 ENCOUNTER — ANESTHESIA (OUTPATIENT)
Dept: OPERATING ROOM | Facility: HOSPITAL | Age: 60
End: 2025-02-03
Payer: COMMERCIAL

## 2025-02-03 ENCOUNTER — APPOINTMENT (OUTPATIENT)
Dept: RADIOLOGY | Facility: HOSPITAL | Age: 60
End: 2025-02-03
Payer: COMMERCIAL

## 2025-02-03 ENCOUNTER — HOSPITAL ENCOUNTER (OUTPATIENT)
Facility: HOSPITAL | Age: 60
Discharge: HOME HEALTH | End: 2025-02-04
Attending: ORTHOPAEDIC SURGERY | Admitting: ORTHOPAEDIC SURGERY
Payer: COMMERCIAL

## 2025-02-03 ENCOUNTER — HOME HEALTH ADMISSION (OUTPATIENT)
Dept: HOME HEALTH SERVICES | Facility: HOME HEALTH | Age: 60
End: 2025-02-03
Payer: COMMERCIAL

## 2025-02-03 DIAGNOSIS — Z47.1 AFTERCARE FOLLOWING RIGHT HIP JOINT REPLACEMENT SURGERY: Primary | ICD-10-CM

## 2025-02-03 DIAGNOSIS — Z96.641 AFTERCARE FOLLOWING RIGHT HIP JOINT REPLACEMENT SURGERY: Primary | ICD-10-CM

## 2025-02-03 PROCEDURE — 3600000018 HC OR TIME - INITIAL BASE CHARGE - PROCEDURE LEVEL SIX: Performed by: ORTHOPAEDIC SURGERY

## 2025-02-03 PROCEDURE — 2500000004 HC RX 250 GENERAL PHARMACY W/ HCPCS (ALT 636 FOR OP/ED): Performed by: STUDENT IN AN ORGANIZED HEALTH CARE EDUCATION/TRAINING PROGRAM

## 2025-02-03 PROCEDURE — 3700000001 HC GENERAL ANESTHESIA TIME - INITIAL BASE CHARGE: Performed by: ORTHOPAEDIC SURGERY

## 2025-02-03 PROCEDURE — 73502 X-RAY EXAM HIP UNI 2-3 VIEWS: CPT | Mod: RIGHT SIDE | Performed by: RADIOLOGY

## 2025-02-03 PROCEDURE — 2500000001 HC RX 250 WO HCPCS SELF ADMINISTERED DRUGS (ALT 637 FOR MEDICARE OP): Performed by: EMERGENCY MEDICINE

## 2025-02-03 PROCEDURE — 7100000002 HC RECOVERY ROOM TIME - EACH INCREMENTAL 1 MINUTE: Performed by: ORTHOPAEDIC SURGERY

## 2025-02-03 PROCEDURE — 7100000001 HC RECOVERY ROOM TIME - INITIAL BASE CHARGE: Performed by: ORTHOPAEDIC SURGERY

## 2025-02-03 PROCEDURE — A6213 FOAM DRG >16<=48 SQ IN W/BDR: HCPCS | Performed by: ORTHOPAEDIC SURGERY

## 2025-02-03 PROCEDURE — 94760 N-INVAS EAR/PLS OXIMETRY 1: CPT | Mod: 59

## 2025-02-03 PROCEDURE — 2780000003 HC OR 278 NO HCPCS: Performed by: ORTHOPAEDIC SURGERY

## 2025-02-03 PROCEDURE — 7100000011 HC EXTENDED STAY RECOVERY HOURLY - NURSING UNIT

## 2025-02-03 PROCEDURE — 2500000004 HC RX 250 GENERAL PHARMACY W/ HCPCS (ALT 636 FOR OP/ED): Performed by: ANESTHESIOLOGY

## 2025-02-03 PROCEDURE — 97162 PT EVAL MOD COMPLEX 30 MIN: CPT | Mod: GP

## 2025-02-03 PROCEDURE — A4649 SURGICAL SUPPLIES: HCPCS | Performed by: ORTHOPAEDIC SURGERY

## 2025-02-03 PROCEDURE — 97110 THERAPEUTIC EXERCISES: CPT | Mod: GP

## 2025-02-03 PROCEDURE — A27130 PR TOTAL HIP ARTHROPLASTY: Performed by: ANESTHESIOLOGY

## 2025-02-03 PROCEDURE — 2500000001 HC RX 250 WO HCPCS SELF ADMINISTERED DRUGS (ALT 637 FOR MEDICARE OP): Performed by: ORTHOPAEDIC SURGERY

## 2025-02-03 PROCEDURE — 3600000017 HC OR TIME - EACH INCREMENTAL 1 MINUTE - PROCEDURE LEVEL SIX: Performed by: ORTHOPAEDIC SURGERY

## 2025-02-03 PROCEDURE — 2500000004 HC RX 250 GENERAL PHARMACY W/ HCPCS (ALT 636 FOR OP/ED): Performed by: ORTHOPAEDIC SURGERY

## 2025-02-03 PROCEDURE — RXMED WILLOW AMBULATORY MEDICATION CHARGE

## 2025-02-03 PROCEDURE — C1776 JOINT DEVICE (IMPLANTABLE): HCPCS | Performed by: ORTHOPAEDIC SURGERY

## 2025-02-03 PROCEDURE — 3700000002 HC GENERAL ANESTHESIA TIME - EACH INCREMENTAL 1 MINUTE: Performed by: ORTHOPAEDIC SURGERY

## 2025-02-03 PROCEDURE — 2720000007 HC OR 272 NO HCPCS: Performed by: ORTHOPAEDIC SURGERY

## 2025-02-03 PROCEDURE — 99222 1ST HOSP IP/OBS MODERATE 55: CPT | Performed by: EMERGENCY MEDICINE

## 2025-02-03 PROCEDURE — 2500000001 HC RX 250 WO HCPCS SELF ADMINISTERED DRUGS (ALT 637 FOR MEDICARE OP): Performed by: STUDENT IN AN ORGANIZED HEALTH CARE EDUCATION/TRAINING PROGRAM

## 2025-02-03 PROCEDURE — 9420000001 HC RT PATIENT EDUCATION 5 MIN

## 2025-02-03 PROCEDURE — 97116 GAIT TRAINING THERAPY: CPT | Mod: GP

## 2025-02-03 PROCEDURE — 2500000005 HC RX 250 GENERAL PHARMACY W/O HCPCS: Performed by: ORTHOPAEDIC SURGERY

## 2025-02-03 PROCEDURE — 73502 X-RAY EXAM HIP UNI 2-3 VIEWS: CPT | Mod: RT

## 2025-02-03 DEVICE — CORAIL HIP SYSTEM CEMENTLESS FEMORAL STEM 12/14 AMT 135 DEGREES KS SIZE 12 HA COATED STANDARD NO COLLAR
Type: IMPLANTABLE DEVICE | Site: HIP | Status: FUNCTIONAL
Brand: CORAIL

## 2025-02-03 DEVICE — KNOTLESS TISSUE CONTROL DEVICE, UNDYED UNIDIRECTIONAL (ANTIBACTERIAL) SYNTHETIC ABSORBABLE DEVICE
Type: IMPLANTABLE DEVICE | Site: HIP | Status: NON-FUNCTIONAL
Brand: STRATAFIX

## 2025-02-03 DEVICE — BIOLOX DELTA CERAMIC FEMORAL HEAD +5.0 36MM DIA 12/14 TAPER
Type: IMPLANTABLE DEVICE | Site: HIP | Status: FUNCTIONAL
Brand: BIOLOX DELTA

## 2025-02-03 DEVICE — PINNACLE HIP SOLUTIONS ALTRX POLYETHYLENE ACETABULAR LINER +4 10 DEGREE 36MM ID 56MM OD
Type: IMPLANTABLE DEVICE | Site: HIP | Status: FUNCTIONAL
Brand: PINNACLE ALTRX

## 2025-02-03 DEVICE — PINNACLE GRIPTION ACETABULAR SHELL SECTOR 56MM OD
Type: IMPLANTABLE DEVICE | Site: HIP | Status: FUNCTIONAL
Brand: PINNACLE GRIPTION

## 2025-02-03 RX ORDER — SODIUM CHLORIDE, SODIUM LACTATE, POTASSIUM CHLORIDE, CALCIUM CHLORIDE 600; 310; 30; 20 MG/100ML; MG/100ML; MG/100ML; MG/100ML
100 INJECTION, SOLUTION INTRAVENOUS CONTINUOUS
Status: ACTIVE | OUTPATIENT
Start: 2025-02-03 | End: 2025-02-03

## 2025-02-03 RX ORDER — FLUTICASONE PROPIONATE 50 MCG
1 SPRAY, SUSPENSION (ML) NASAL DAILY PRN
Status: DISCONTINUED | OUTPATIENT
Start: 2025-02-03 | End: 2025-02-03

## 2025-02-03 RX ORDER — KETOROLAC TROMETHAMINE 15 MG/ML
15 INJECTION, SOLUTION INTRAMUSCULAR; INTRAVENOUS EVERY 6 HOURS SCHEDULED
Status: COMPLETED | OUTPATIENT
Start: 2025-02-03 | End: 2025-02-04

## 2025-02-03 RX ORDER — MULTIVITAMIN/IRON/FOLIC ACID 18MG-0.4MG
1 TABLET ORAL DAILY
Status: DISCONTINUED | OUTPATIENT
Start: 2025-02-03 | End: 2025-02-03

## 2025-02-03 RX ORDER — TRAMADOL HYDROCHLORIDE 50 MG/1
50 TABLET ORAL EVERY 6 HOURS PRN
Status: DISCONTINUED | OUTPATIENT
Start: 2025-02-03 | End: 2025-02-04 | Stop reason: HOSPADM

## 2025-02-03 RX ORDER — HYDROCODONE BITARTRATE AND ACETAMINOPHEN 5; 325 MG/1; MG/1
2 TABLET ORAL EVERY 4 HOURS PRN
Status: DISCONTINUED | OUTPATIENT
Start: 2025-02-03 | End: 2025-02-03

## 2025-02-03 RX ORDER — HYDRALAZINE HYDROCHLORIDE 20 MG/ML
5 INJECTION INTRAMUSCULAR; INTRAVENOUS EVERY 30 MIN PRN
Status: DISCONTINUED | OUTPATIENT
Start: 2025-02-03 | End: 2025-02-03 | Stop reason: HOSPADM

## 2025-02-03 RX ORDER — SIMETHICONE 80 MG
80 TABLET,CHEWABLE ORAL 4 TIMES DAILY PRN
Status: DISCONTINUED | OUTPATIENT
Start: 2025-02-03 | End: 2025-02-04 | Stop reason: HOSPADM

## 2025-02-03 RX ORDER — ACETAMINOPHEN 325 MG/1
650 TABLET ORAL EVERY 6 HOURS SCHEDULED
Status: DISCONTINUED | OUTPATIENT
Start: 2025-02-03 | End: 2025-02-04 | Stop reason: HOSPADM

## 2025-02-03 RX ORDER — PROPOFOL 10 MG/ML
INJECTION, EMULSION INTRAVENOUS AS NEEDED
Status: DISCONTINUED | OUTPATIENT
Start: 2025-02-03 | End: 2025-02-03

## 2025-02-03 RX ORDER — SODIUM CHLORIDE, SODIUM LACTATE, POTASSIUM CHLORIDE, CALCIUM CHLORIDE 600; 310; 30; 20 MG/100ML; MG/100ML; MG/100ML; MG/100ML
75 INJECTION, SOLUTION INTRAVENOUS CONTINUOUS
Status: ACTIVE | OUTPATIENT
Start: 2025-02-03 | End: 2025-02-03

## 2025-02-03 RX ORDER — ACETAMINOPHEN 325 MG/1
975 TABLET ORAL ONCE
Status: COMPLETED | OUTPATIENT
Start: 2025-02-03 | End: 2025-02-03

## 2025-02-03 RX ORDER — ALBUTEROL SULFATE 0.83 MG/ML
2.5 SOLUTION RESPIRATORY (INHALATION) ONCE AS NEEDED
Status: DISCONTINUED | OUTPATIENT
Start: 2025-02-03 | End: 2025-02-03 | Stop reason: HOSPADM

## 2025-02-03 RX ORDER — LABETALOL HYDROCHLORIDE 5 MG/ML
5 INJECTION, SOLUTION INTRAVENOUS
Status: DISCONTINUED | OUTPATIENT
Start: 2025-02-03 | End: 2025-02-03 | Stop reason: HOSPADM

## 2025-02-03 RX ORDER — DIPHENHYDRAMINE HCL 25 MG
25 CAPSULE ORAL
COMMUNITY

## 2025-02-03 RX ORDER — CALCIUM CARBONATE 200(500)MG
500 TABLET,CHEWABLE ORAL 4 TIMES DAILY PRN
Status: DISCONTINUED | OUTPATIENT
Start: 2025-02-03 | End: 2025-02-04 | Stop reason: HOSPADM

## 2025-02-03 RX ORDER — MELOXICAM 7.5 MG/1
7.5 TABLET ORAL ONCE
Status: COMPLETED | OUTPATIENT
Start: 2025-02-03 | End: 2025-02-03

## 2025-02-03 RX ORDER — CHOLECALCIFEROL (VITAMIN D3) 25 MCG
2000 TABLET ORAL DAILY
Status: DISCONTINUED | OUTPATIENT
Start: 2025-02-04 | End: 2025-02-04 | Stop reason: HOSPADM

## 2025-02-03 RX ORDER — LIDOCAINE HYDROCHLORIDE 10 MG/ML
INJECTION, SOLUTION EPIDURAL; INFILTRATION; INTRACAUDAL; PERINEURAL AS NEEDED
Status: DISCONTINUED | OUTPATIENT
Start: 2025-02-03 | End: 2025-02-03

## 2025-02-03 RX ORDER — GLYCOPYRROLATE 0.2 MG/ML
0.4 INJECTION INTRAMUSCULAR; INTRAVENOUS EVERY 5 MIN PRN
Status: DISCONTINUED | OUTPATIENT
Start: 2025-02-03 | End: 2025-02-03 | Stop reason: HOSPADM

## 2025-02-03 RX ORDER — ASPIRIN 325 MG
325 TABLET ORAL DAILY
Status: DISCONTINUED | OUTPATIENT
Start: 2025-02-03 | End: 2025-02-04 | Stop reason: HOSPADM

## 2025-02-03 RX ORDER — DOCUSATE SODIUM 100 MG/1
100 CAPSULE, LIQUID FILLED ORAL 2 TIMES DAILY PRN
Status: DISCONTINUED | OUTPATIENT
Start: 2025-02-03 | End: 2025-02-04 | Stop reason: HOSPADM

## 2025-02-03 RX ORDER — CYCLOBENZAPRINE HCL 10 MG
5 TABLET ORAL 3 TIMES DAILY PRN
Status: DISCONTINUED | OUTPATIENT
Start: 2025-02-03 | End: 2025-02-04 | Stop reason: HOSPADM

## 2025-02-03 RX ORDER — ROPIVACAINE/EPI/CLONIDINE/KET 2.46-0.005
SYRINGE (ML) INJECTION AS NEEDED
Status: DISCONTINUED | OUTPATIENT
Start: 2025-02-03 | End: 2025-02-03 | Stop reason: HOSPADM

## 2025-02-03 RX ORDER — ONDANSETRON HYDROCHLORIDE 2 MG/ML
INJECTION, SOLUTION INTRAVENOUS AS NEEDED
Status: DISCONTINUED | OUTPATIENT
Start: 2025-02-03 | End: 2025-02-03

## 2025-02-03 RX ORDER — VANCOMYCIN 1.5 G/300ML
1500 INJECTION, SOLUTION INTRAVENOUS ONCE
Status: COMPLETED | OUTPATIENT
Start: 2025-02-03 | End: 2025-02-03

## 2025-02-03 RX ORDER — TRAMADOL HYDROCHLORIDE 50 MG/1
50 TABLET ORAL EVERY 6 HOURS PRN
Status: DISCONTINUED | OUTPATIENT
Start: 2025-02-03 | End: 2025-02-03

## 2025-02-03 RX ORDER — MIDAZOLAM HYDROCHLORIDE 1 MG/ML
INJECTION, SOLUTION INTRAMUSCULAR; INTRAVENOUS AS NEEDED
Status: DISCONTINUED | OUTPATIENT
Start: 2025-02-03 | End: 2025-02-03

## 2025-02-03 RX ORDER — IPRATROPIUM BROMIDE 0.5 MG/2.5ML
500 SOLUTION RESPIRATORY (INHALATION) ONCE AS NEEDED
Status: DISCONTINUED | OUTPATIENT
Start: 2025-02-03 | End: 2025-02-03 | Stop reason: HOSPADM

## 2025-02-03 RX ORDER — POLYETHYLENE GLYCOL 3350 17 G/17G
17 POWDER, FOR SOLUTION ORAL DAILY
Status: DISCONTINUED | OUTPATIENT
Start: 2025-02-03 | End: 2025-02-04 | Stop reason: HOSPADM

## 2025-02-03 RX ORDER — HYDROMORPHONE HYDROCHLORIDE 0.2 MG/ML
0.2 INJECTION INTRAMUSCULAR; INTRAVENOUS; SUBCUTANEOUS EVERY 5 MIN PRN
Status: DISCONTINUED | OUTPATIENT
Start: 2025-02-03 | End: 2025-02-03 | Stop reason: HOSPADM

## 2025-02-03 RX ORDER — ONDANSETRON HYDROCHLORIDE 2 MG/ML
4 INJECTION, SOLUTION INTRAVENOUS EVERY 4 HOURS PRN
Status: DISCONTINUED | OUTPATIENT
Start: 2025-02-03 | End: 2025-02-04 | Stop reason: HOSPADM

## 2025-02-03 RX ORDER — DIPHENHYDRAMINE HCL 25 MG
25 TABLET ORAL NIGHTLY PRN
Status: DISCONTINUED | OUTPATIENT
Start: 2025-02-03 | End: 2025-02-04 | Stop reason: HOSPADM

## 2025-02-03 RX ORDER — FERROUS SULFATE 325(65) MG
65 TABLET ORAL DAILY
Status: DISCONTINUED | OUTPATIENT
Start: 2025-02-03 | End: 2025-02-04 | Stop reason: HOSPADM

## 2025-02-03 RX ORDER — HYDROCODONE BITARTRATE AND ACETAMINOPHEN 5; 325 MG/1; MG/1
1 TABLET ORAL EVERY 4 HOURS PRN
Status: DISCONTINUED | OUTPATIENT
Start: 2025-02-03 | End: 2025-02-03

## 2025-02-03 RX ORDER — ASPIRIN 325 MG
325 TABLET, DELAYED RELEASE (ENTERIC COATED) ORAL 2 TIMES DAILY
Status: DISCONTINUED | OUTPATIENT
Start: 2025-02-03 | End: 2025-02-03

## 2025-02-03 RX ORDER — VANCOMYCIN HYDROCHLORIDE 1 G/20ML
1 INJECTION, POWDER, LYOPHILIZED, FOR SOLUTION INTRAVENOUS ONCE
Status: DISCONTINUED | OUTPATIENT
Start: 2025-02-03 | End: 2025-02-03

## 2025-02-03 RX ORDER — FENTANYL CITRATE 50 UG/ML
INJECTION, SOLUTION INTRAMUSCULAR; INTRAVENOUS AS NEEDED
Status: DISCONTINUED | OUTPATIENT
Start: 2025-02-03 | End: 2025-02-03

## 2025-02-03 RX ORDER — FENTANYL CITRATE 50 UG/ML
25 INJECTION, SOLUTION INTRAMUSCULAR; INTRAVENOUS
Status: DISCONTINUED | OUTPATIENT
Start: 2025-02-03 | End: 2025-02-03 | Stop reason: HOSPADM

## 2025-02-03 RX ORDER — TALC
3 POWDER (GRAM) TOPICAL NIGHTLY PRN
Status: DISCONTINUED | OUTPATIENT
Start: 2025-02-03 | End: 2025-02-04 | Stop reason: HOSPADM

## 2025-02-03 RX ORDER — MEPERIDINE HYDROCHLORIDE 25 MG/ML
12.5 INJECTION INTRAMUSCULAR; INTRAVENOUS; SUBCUTANEOUS EVERY 10 MIN PRN
Status: DISCONTINUED | OUTPATIENT
Start: 2025-02-03 | End: 2025-02-03 | Stop reason: HOSPADM

## 2025-02-03 RX ORDER — SODIUM CHLORIDE, SODIUM LACTATE, POTASSIUM CHLORIDE, CALCIUM CHLORIDE 600; 310; 30; 20 MG/100ML; MG/100ML; MG/100ML; MG/100ML
100 INJECTION, SOLUTION INTRAVENOUS CONTINUOUS
Status: ACTIVE | OUTPATIENT
Start: 2025-02-03 | End: 2025-02-04

## 2025-02-03 RX ORDER — PHENYLEPHRINE HCL IN 0.9% NACL 1 MG/10 ML
SYRINGE (ML) INTRAVENOUS AS NEEDED
Status: DISCONTINUED | OUTPATIENT
Start: 2025-02-03 | End: 2025-02-03

## 2025-02-03 RX ADMIN — POVIDONE-IODINE 1 APPLICATION: 5 SOLUTION TOPICAL at 06:06

## 2025-02-03 RX ADMIN — FENTANYL CITRATE 25 MCG: 50 INJECTION INTRAMUSCULAR; INTRAVENOUS at 11:22

## 2025-02-03 RX ADMIN — FENTANYL CITRATE 25 MCG: 50 INJECTION INTRAMUSCULAR; INTRAVENOUS at 08:29

## 2025-02-03 RX ADMIN — POLYETHYLENE GLYCOL 3350 17 G: 17 POWDER, FOR SOLUTION ORAL at 18:01

## 2025-02-03 RX ADMIN — SODIUM CHLORIDE, POTASSIUM CHLORIDE, SODIUM LACTATE AND CALCIUM CHLORIDE: 600; 310; 30; 20 INJECTION, SOLUTION INTRAVENOUS at 07:07

## 2025-02-03 RX ADMIN — CALCIUM CARBONATE 500 MG: 500 TABLET, CHEWABLE ORAL at 20:47

## 2025-02-03 RX ADMIN — Medication 200 MCG: at 08:29

## 2025-02-03 RX ADMIN — SODIUM CHLORIDE, SODIUM LACTATE, POTASSIUM CHLORIDE, AND CALCIUM CHLORIDE 100 ML/HR: 600; 310; 30; 20 INJECTION, SOLUTION INTRAVENOUS at 12:53

## 2025-02-03 RX ADMIN — MIDAZOLAM 2 MG: 1 INJECTION INTRAMUSCULAR; INTRAVENOUS at 07:07

## 2025-02-03 RX ADMIN — KETOROLAC TROMETHAMINE 15 MG: 15 INJECTION, SOLUTION INTRAMUSCULAR; INTRAVENOUS at 18:01

## 2025-02-03 RX ADMIN — HYDROMORPHONE HYDROCHLORIDE 0.2 MG: 0.2 INJECTION, SOLUTION INTRAMUSCULAR; INTRAVENOUS; SUBCUTANEOUS at 09:25

## 2025-02-03 RX ADMIN — FERROUS SULFATE TAB 325 MG (65 MG ELEMENTAL FE) 325 MG: 325 (65 FE) TAB at 14:45

## 2025-02-03 RX ADMIN — ONDANSETRON 4 MG: 2 INJECTION, SOLUTION INTRAMUSCULAR; INTRAVENOUS at 08:41

## 2025-02-03 RX ADMIN — LIDOCAINE HYDROCHLORIDE 5 ML: 10 INJECTION, SOLUTION EPIDURAL; INFILTRATION; INTRACAUDAL; PERINEURAL at 07:20

## 2025-02-03 RX ADMIN — Medication 100 MCG: at 07:56

## 2025-02-03 RX ADMIN — FENTANYL CITRATE 25 MCG: 50 INJECTION INTRAMUSCULAR; INTRAVENOUS at 07:36

## 2025-02-03 RX ADMIN — Medication 2 L/MIN: at 13:35

## 2025-02-03 RX ADMIN — KETOROLAC TROMETHAMINE 15 MG: 15 INJECTION, SOLUTION INTRAMUSCULAR; INTRAVENOUS at 23:46

## 2025-02-03 RX ADMIN — MEPIVACAINE HYDROCHLORIDE 3.5 ML: 15 INJECTION, SOLUTION EPIDURAL; INFILTRATION at 07:17

## 2025-02-03 RX ADMIN — Medication 200 MCG: at 08:17

## 2025-02-03 RX ADMIN — BENZOCAINE AND MENTHOL 1 LOZENGE: 15; 3.6 LOZENGE ORAL at 12:52

## 2025-02-03 RX ADMIN — ACETAMINOPHEN 650 MG: 325 TABLET ORAL at 12:52

## 2025-02-03 RX ADMIN — PROPOFOL 70 MG: 10 INJECTION, EMULSION INTRAVENOUS at 07:20

## 2025-02-03 RX ADMIN — ACETAMINOPHEN 975 MG: 325 TABLET ORAL at 06:07

## 2025-02-03 RX ADMIN — VANCOMYCIN 1.5 G: 1.5 INJECTION, SOLUTION INTRAVENOUS at 06:12

## 2025-02-03 RX ADMIN — MELOXICAM 7.5 MG: 7.5 TABLET ORAL at 06:07

## 2025-02-03 RX ADMIN — Medication 21 PERCENT: at 15:56

## 2025-02-03 RX ADMIN — ACETAMINOPHEN 650 MG: 325 TABLET ORAL at 18:01

## 2025-02-03 RX ADMIN — KETOROLAC TROMETHAMINE 15 MG: 15 INJECTION, SOLUTION INTRAMUSCULAR; INTRAVENOUS at 12:53

## 2025-02-03 RX ADMIN — FENTANYL CITRATE 25 MCG: 50 INJECTION INTRAMUSCULAR; INTRAVENOUS at 10:30

## 2025-02-03 RX ADMIN — ACETAMINOPHEN 650 MG: 325 TABLET ORAL at 23:46

## 2025-02-03 RX ADMIN — SODIUM CHLORIDE, SODIUM LACTATE, POTASSIUM CHLORIDE, AND CALCIUM CHLORIDE 100 ML/HR: 600; 310; 30; 20 INJECTION, SOLUTION INTRAVENOUS at 19:45

## 2025-02-03 RX ADMIN — PROPOFOL 120 MCG/KG/MIN: 10 INJECTION, EMULSION INTRAVENOUS at 07:19

## 2025-02-03 RX ADMIN — GLYCOPYRROLATE 0.4 MG: 0.2 INJECTION INTRAMUSCULAR; INTRAVENOUS at 09:54

## 2025-02-03 RX ADMIN — ONDANSETRON 4 MG: 2 INJECTION INTRAMUSCULAR; INTRAVENOUS at 21:55

## 2025-02-03 RX ADMIN — FENTANYL CITRATE 25 MCG: 50 INJECTION INTRAMUSCULAR; INTRAVENOUS at 08:41

## 2025-02-03 RX ADMIN — ASPIRIN 325 MG: 325 TABLET ORAL at 14:45

## 2025-02-03 SDOH — SOCIAL STABILITY: SOCIAL INSECURITY: WERE YOU ABLE TO COMPLETE ALL THE BEHAVIORAL HEALTH SCREENINGS?: YES

## 2025-02-03 SDOH — HEALTH STABILITY: MENTAL HEALTH: CURRENT SMOKER: 0

## 2025-02-03 SDOH — SOCIAL STABILITY: SOCIAL INSECURITY
ASK PARENT OR GUARDIAN: ARE THERE TIMES WHEN YOU, YOUR CHILD(REN), OR ANY MEMBER OF YOUR HOUSEHOLD FEEL UNSAFE, HARMED, OR THREATENED AROUND PERSONS WITH WHOM YOU KNOW OR LIVE?: NO

## 2025-02-03 SDOH — SOCIAL STABILITY: SOCIAL INSECURITY: HAS ANYONE EVER THREATENED TO HURT YOUR FAMILY OR YOUR PETS?: NO

## 2025-02-03 SDOH — SOCIAL STABILITY: SOCIAL INSECURITY: ABUSE: ADULT

## 2025-02-03 SDOH — SOCIAL STABILITY: SOCIAL INSECURITY: HAVE YOU HAD THOUGHTS OF HARMING ANYONE ELSE?: NO

## 2025-02-03 SDOH — ECONOMIC STABILITY: HOUSING INSECURITY: DO YOU FEEL UNSAFE GOING BACK TO THE PLACE WHERE YOU LIVE?: NO

## 2025-02-03 SDOH — SOCIAL STABILITY: SOCIAL INSECURITY: HAVE YOU HAD ANY THOUGHTS OF HARMING ANYONE ELSE?: NO

## 2025-02-03 SDOH — SOCIAL STABILITY: SOCIAL INSECURITY: DO YOU FEEL ANYONE HAS EXPLOITED OR TAKEN ADVANTAGE OF YOU FINANCIALLY OR OF YOUR PERSONAL PROPERTY?: NO

## 2025-02-03 SDOH — SOCIAL STABILITY: SOCIAL INSECURITY: ARE THERE ANY APPARENT SIGNS OF INJURIES/BEHAVIORS THAT COULD BE RELATED TO ABUSE/NEGLECT?: NO

## 2025-02-03 SDOH — SOCIAL STABILITY: SOCIAL INSECURITY: DOES ANYONE TRY TO KEEP YOU FROM HAVING/CONTACTING OTHER FRIENDS OR DOING THINGS OUTSIDE YOUR HOME?: NO

## 2025-02-03 SDOH — SOCIAL STABILITY: SOCIAL INSECURITY: DO YOU FEEL UNSAFE GOING BACK TO THE PLACE WHERE YOU ARE LIVING?: NO

## 2025-02-03 SDOH — SOCIAL STABILITY: SOCIAL INSECURITY: ARE YOU OR HAVE YOU BEEN THREATENED OR ABUSED PHYSICALLY, EMOTIONALLY, OR SEXUALLY BY ANYONE?: NO

## 2025-02-03 ASSESSMENT — ACTIVITIES OF DAILY LIVING (ADL)
WALKS IN HOME: NEEDS ASSISTANCE
FEEDING YOURSELF: INDEPENDENT
ADL_ASSISTANCE: INDEPENDENT
JUDGMENT_ADEQUATE_SAFELY_COMPLETE_DAILY_ACTIVITIES: YES
ADLS_ADDRESSED: YES
HEARING - RIGHT EAR: FUNCTIONAL
BATHING: INDEPENDENT
TOILETING: NEEDS ASSISTANCE
DRESSING YOURSELF: NEEDS ASSISTANCE
ASSISTIVE_DEVICE: WALKER;CANE
LACK_OF_TRANSPORTATION: NO
GROOMING: INDEPENDENT
HEARING - LEFT EAR: FUNCTIONAL
PATIENT'S MEMORY ADEQUATE TO SAFELY COMPLETE DAILY ACTIVITIES?: YES
ADEQUATE_TO_COMPLETE_ADL: YES

## 2025-02-03 ASSESSMENT — COGNITIVE AND FUNCTIONAL STATUS - GENERAL
MOVING FROM LYING ON BACK TO SITTING ON SIDE OF FLAT BED WITH BEDRAILS: A LITTLE
CLIMB 3 TO 5 STEPS WITH RAILING: A LOT
DAILY ACTIVITIY SCORE: 21
HELP NEEDED FOR BATHING: A LITTLE
DRESSING REGULAR LOWER BODY CLOTHING: A LITTLE
MOVING TO AND FROM BED TO CHAIR: A LOT
TURNING FROM BACK TO SIDE WHILE IN FLAT BAD: A LITTLE
MOBILITY SCORE: 18
TOILETING: A LITTLE
CLIMB 3 TO 5 STEPS WITH RAILING: A LITTLE
MOBILITY SCORE: 14
MOVING FROM LYING ON BACK TO SITTING ON SIDE OF FLAT BED WITH BEDRAILS: A LITTLE
MOVING TO AND FROM BED TO CHAIR: A LITTLE
WALKING IN HOSPITAL ROOM: A LOT
TURNING FROM BACK TO SIDE WHILE IN FLAT BAD: A LITTLE
PATIENT BASELINE BEDBOUND: NO
STANDING UP FROM CHAIR USING ARMS: A LOT
STANDING UP FROM CHAIR USING ARMS: A LITTLE
WALKING IN HOSPITAL ROOM: A LITTLE

## 2025-02-03 ASSESSMENT — PAIN SCALES - GENERAL
PAIN_LEVEL: 0
PAINLEVEL_OUTOF10: 5 - MODERATE PAIN
PAINLEVEL_OUTOF10: 5 - MODERATE PAIN
PAINLEVEL_OUTOF10: 3
PAINLEVEL_OUTOF10: 8
PAINLEVEL_OUTOF10: 3
PAINLEVEL_OUTOF10: 3
PAINLEVEL_OUTOF10: 0 - NO PAIN
PAINLEVEL_OUTOF10: 4
PAINLEVEL_OUTOF10: 7
PAINLEVEL_OUTOF10: 0 - NO PAIN
PAINLEVEL_OUTOF10: 4
PAINLEVEL_OUTOF10: 2
PAINLEVEL_OUTOF10: 8
PAINLEVEL_OUTOF10: 8
PAINLEVEL_OUTOF10: 5 - MODERATE PAIN
PAINLEVEL_OUTOF10: 7
PAINLEVEL_OUTOF10: 5 - MODERATE PAIN
PAINLEVEL_OUTOF10: 5 - MODERATE PAIN
PAINLEVEL_OUTOF10: 2
PAINLEVEL_OUTOF10: 3
PAINLEVEL_OUTOF10: 5 - MODERATE PAIN

## 2025-02-03 ASSESSMENT — PAIN - FUNCTIONAL ASSESSMENT
PAIN_FUNCTIONAL_ASSESSMENT: 0-10
PAIN_FUNCTIONAL_ASSESSMENT: FLACC (FACE, LEGS, ACTIVITY, CRY, CONSOLABILITY)
PAIN_FUNCTIONAL_ASSESSMENT: 0-10

## 2025-02-03 ASSESSMENT — PAIN DESCRIPTION - DESCRIPTORS
DESCRIPTORS: ACHING
DESCRIPTORS: ACHING
DESCRIPTORS: THROBBING
DESCRIPTORS: ACHING
DESCRIPTORS: DULL
DESCRIPTORS: ACHING
DESCRIPTORS: ACHING
DESCRIPTORS: ACHING;SORE
DESCRIPTORS: ACHING
DESCRIPTORS: ACHING
DESCRIPTORS: ACHING;BURNING

## 2025-02-03 ASSESSMENT — LIFESTYLE VARIABLES
AUDIT-C TOTAL SCORE: 1
AUDIT-C TOTAL SCORE: 1
SKIP TO QUESTIONS 9-10: 1
HOW OFTEN DO YOU HAVE A DRINK CONTAINING ALCOHOL: MONTHLY OR LESS
HOW MANY STANDARD DRINKS CONTAINING ALCOHOL DO YOU HAVE ON A TYPICAL DAY: 1 OR 2
HOW OFTEN DO YOU HAVE 6 OR MORE DRINKS ON ONE OCCASION: NEVER

## 2025-02-03 ASSESSMENT — PAIN DESCRIPTION - ORIENTATION
ORIENTATION: RIGHT

## 2025-02-03 ASSESSMENT — COLUMBIA-SUICIDE SEVERITY RATING SCALE - C-SSRS
6. HAVE YOU EVER DONE ANYTHING, STARTED TO DO ANYTHING, OR PREPARED TO DO ANYTHING TO END YOUR LIFE?: NO
1. IN THE PAST MONTH, HAVE YOU WISHED YOU WERE DEAD OR WISHED YOU COULD GO TO SLEEP AND NOT WAKE UP?: NO
2. HAVE YOU ACTUALLY HAD ANY THOUGHTS OF KILLING YOURSELF?: NO
6. HAVE YOU EVER DONE ANYTHING, STARTED TO DO ANYTHING, OR PREPARED TO DO ANYTHING TO END YOUR LIFE?: NO
2. HAVE YOU ACTUALLY HAD ANY THOUGHTS OF KILLING YOURSELF?: NO
1. IN THE PAST MONTH, HAVE YOU WISHED YOU WERE DEAD OR WISHED YOU COULD GO TO SLEEP AND NOT WAKE UP?: NO

## 2025-02-03 ASSESSMENT — PAIN DESCRIPTION - LOCATION
LOCATION: HIP

## 2025-02-03 ASSESSMENT — PATIENT HEALTH QUESTIONNAIRE - PHQ9
1. LITTLE INTEREST OR PLEASURE IN DOING THINGS: NOT AT ALL
2. FEELING DOWN, DEPRESSED OR HOPELESS: NOT AT ALL
SUM OF ALL RESPONSES TO PHQ9 QUESTIONS 1 & 2: 0

## 2025-02-03 NOTE — CARE PLAN
The patient's goals for the shift include  pain control/safety     The clinical goals for the shift include Pain control/ safety       4th digit

## 2025-02-03 NOTE — CARE PLAN
Spoke with patient; awaiting pain medication from Nursing level 8/10; RT will be notified to do IS instruction when patient has been medicated for pain.

## 2025-02-03 NOTE — PRE-PROCEDURE NOTE
Notified Dr. Ramirez that patient's BP was elevated: 178/11, repeat 173/127, and that patient currently denies of hypertension, admits to history of hypertension in the past but currently not taking any meds for it.

## 2025-02-03 NOTE — PROGRESS NOTES
59 yr old female admitted extended recovery following right hip replacement with Dr. Osman.  Plan is home tomorrow with [facility] PT/OT. SOC confirmed for 2/5/25.  Post op follow up on 2/18/2025 @ 9:30-Pal.  Spouse to transport home and assist her with care as needed, along with her friends.   02/03/25 1441   Discharge Planning   Living Arrangements Spouse/significant other   Support Systems Spouse/significant other   Assistance Needed transportation   Type of Residence Private residence   Number of Stairs to Enter Residence 2   Number of Stairs Within Residence 12  (down to basement)   Do you have animals or pets at home? Yes   Type of Animals or Pets 1 dog   Who is requesting discharge planning? Provider   Home or Post Acute Services In home services   Type of Home Care Services Home PT;Home OT   Expected Discharge Disposition Home H  ( Home Care)   Does the patient need discharge transport arranged? No   Financial Resource Strain   How hard is it for you to pay for the very basics like food, housing, medical care, and heating? Not hard   Housing Stability   In the last 12 months, was there a time when you were not able to pay the mortgage or rent on time? N   In the past 12 months, how many times have you moved where you were living? 0   At any time in the past 12 months, were you homeless or living in a shelter (including now)? N   Transportation Needs   In the past 12 months, has lack of transportation kept you from medical appointments or from getting medications? no   In the past 12 months, has lack of transportation kept you from meetings, work, or from getting things needed for daily living? No   Patient Choice   Provider Choice list and CMS website (https://medicare.gov/care-compare#search) for post-acute Quality and Resource Measure Data were provided and reviewed with: Patient   Patient / Family choosing to utilize agency / facility established prior to hospitalization No   Stroke Family Assessment    Stroke Family Assessment Needed No

## 2025-02-03 NOTE — ANESTHESIA POSTPROCEDURE EVALUATION
Patient: Gabriela Sanford    Procedure Summary       Date: 02/03/25 Room / Location: [facility] / Virtual [facility]    Anesthesia Start: 0707 Anesthesia Stop: 0857    Procedure: ARTHROPLASTY, HIP, TOTAL, POSTERIOR APPROACH (Right: Hip) Diagnosis:       Primary osteoarthritis of right hip      (Primary osteoarthritis of right hip [M16.11])    Surgeons: Chris Esposito DO Responsible Provider: Stephanie Ramirez MD MPH    Anesthesia Type: regional ASA Status: 2            Anesthesia Type: regional    Vitals Value Taken Time   /66 02/03/25 0910   Temp 36.3 °C (97.3 °F) 02/03/25 0853   Pulse 66 02/03/25 0910   Resp 16 02/03/25 0910   SpO2 97 % 02/03/25 0910       Anesthesia Post Evaluation    Patient location during evaluation: PACU  Patient participation: complete - patient participated  Level of consciousness: awake and alert  Pain score: 0  Pain management: adequate  Multimodal analgesia pain management approach  Airway patency: patent  Two or more strategies used to mitigate risk of obstructive sleep apnea  Cardiovascular status: acceptable  Respiratory status: acceptable  Hydration status: acceptable  Postoperative Nausea and Vomiting: none    No notable events documented.

## 2025-02-03 NOTE — PERIOPERATIVE NURSING NOTE
Anesthesia called to bedside. Pt states she feels dizzy,  HR 35, BP 58/33, maintaining pox 98% RA, HOB lowered. O2 100% placed IVF opened.   Dr. Walker at bedside. 0.4mg glycopyrrolate given per Dr. Walker.     0952 bp increased 144/124, HR 60, pt states she feels better.     0955 HR 70, /62, Dr. Ramirez at bedside. Pt feeling better. Maintaining flat position at this time.

## 2025-02-03 NOTE — ANESTHESIA PROCEDURE NOTES
Spinal Block    Patient location during procedure: OR  Start time: 2/3/2025 7:10 AM  End time: 2/3/2025 7:17 AM  Reason for block: primary anesthetic  Staffing  Performed: CRNA   Authorized by: Stephanie Ramirez MD MPH    Performed by: JUAN Mcmillan    Preanesthetic Checklist  Completed: patient identified, IV checked, risks and benefits discussed, surgical consent, monitors and equipment checked, pre-op evaluation, timeout performed and sterile techniques followed  Block Timeout  RN/Licensed healthcare professional reads aloud to the Anesthesia provider and entire team: Patient identity, procedure with side and site, patient position, and as applicable the availability of implants/special equipment/special requirements.  Patient on coagulant treatment: no  Timeout performed at:   Spinal Block  Patient position: sitting  Prep: Betadine  Sterility prep: drape, gloves and mask  Sedation level: light sedation  Patient monitoring: blood pressure, continuous pulse oximetry and heart rate  Approach: midline  Vertebral space: L3-4  Injection technique: single-shot  Needle  Needle type: pencil-point   Needle gauge: 24 G  Needle length: 3.5 in  Free flowing CSF: yes    Assessment  Sensory level: T6  Block outcome: patient comfortable  Procedure assessment: patient sedated but conversant throughout procedure and patient tolerated procedure well with no immediate complications

## 2025-02-03 NOTE — NURSING NOTE
Assisted patient to the bedside commode at this time. Pt c/o dizziness with ambulation. Orthostatic vitals taken and pt standing vs lying systolic differed more than 20 mmHg. Pt assisted back into bed safely with call light in reach. Pt states dizziness is improved now that she is lying. MD Mathur notified and verbal orders to continue to keep pt on IV fluids.

## 2025-02-03 NOTE — ANESTHESIA PREPROCEDURE EVALUATION
Patient: Gabriela Sanford    Procedure Information       Date/Time: 02/03/25 0650    Procedure: ARTHROPLASTY, HIP, TOTAL, POSTERIOR APPROACH ** Latex Allergy ** (AQUAMANTYS, DEPUY) ** Rapid Recovery ** (Right: Hip)    Location: [facility] / [facility]    Surgeons: Chris Esposito, DO            Relevant Problems   Anesthesia (within normal limits)      Cardiac  BP elevated today, not taking any medication, states values have been good at home    ECHO 2022:  The left ventricle is normal in size. There is no left ventricular hypertrophy.   Left ventricular systolic function is normal. EF = 57 ± 5% (2D biplane) Grade I   left ventricular diastolic dysfunction.   - The right ventricle is normal in size. Right ventricular systolic function is   normal.   - The left atrial cavity is mildly dilated.   - There are no significant valvular abnormalities.   - The patient has not had a prior CC echocardiographic exam for comparison.         Pulmonary (within normal limits)      Neuro (within normal limits)      GI (within normal limits)      /Renal (within normal limits)      Liver (within normal limits)      Endocrine (within normal limits)      Hematology (within normal limits)      Musculoskeletal   (+) Primary osteoarthritis of right hip      HEENT (within normal limits)      ID (within normal limits)      Skin (within normal limits)      GYN (within normal limits)     Past Surgical History:   Procedure Laterality Date    KNEE ARTHROSCOPY W/ ARTHROTOMY Left        Clinical information reviewed:   Tobacco  Allergies  Meds   Med Hx  Surg Hx  OB Status  Fam Hx  Soc   Hx        NPO Detail:  NPO/Void Status  Date of Last Liquid: 02/02/25  Time of Last Liquid: 1900  Date of Last Solid: 02/02/25  Time of Last Solid: 1900  Time of Last Void: 0550      Lab Results   Component Value Date    WBC 8.0 01/20/2025    HGB 13.4 01/20/2025    HCT 41.6 01/20/2025    MCV 91 01/20/2025     01/20/2025       Chemistry    Lab  Results   Component Value Date/Time     01/20/2025 1533    K 3.7 01/20/2025 1533     01/20/2025 1533    CO2 29 01/20/2025 1533    BUN 21 01/20/2025 1533    CREATININE 0.75 01/20/2025 1533    Lab Results   Component Value Date/Time    CALCIUM 9.8 01/20/2025 1533    ALKPHOS 97 01/20/2025 1533    AST 17 01/20/2025 1533    ALT 14 01/20/2025 1533    BILITOT 0.4 01/20/2025 1533             Physical Exam    Airway  Mallampati: II  TM distance: >3 FB  Neck ROM: full     Cardiovascular    Dental    Pulmonary    Abdominal          Anesthesia Plan    History of general anesthesia?: yes  History of complications of general anesthesia?: no    ASA 2     regional     The patient is not a current smoker.  Patient was not previously instructed to abstain from smoking on day of procedure.  Patient did not smoke on day of procedure.  Education provided regarding risk of obstructive sleep apnea.  intravenous induction   Postoperative administration of opioids is intended.  Anesthetic plan and risks discussed with patient.  Use of blood products discussed with patient who consented to blood products.    Plan discussed with CRNA and CAA.

## 2025-02-03 NOTE — CARE PLAN
Patient finished lunch; still very sleepy; unable to instruct with Incentive Spirometry at this time; SpO2 95% on 2L nasal cannula; IS in room; 2 oxygen extension tubing added.

## 2025-02-03 NOTE — PROGRESS NOTES
Physical Therapy Evaluation & Treatment    Patient Name: Gabriela Sanford  MRN: [id number]  Department: [facility]  Room: [facility]  Today's Date: 2/3/2025   Time Calculation  Start Time: 1400  Stop Time: 1444  Time Calculation (min): 44 min    Assessment/Plan   PT Assessment  PT Assessment Results: Decreased strength, Decreased range of motion, Decreased endurance, Impaired balance, Orthopedic restrictions, Pain, Decreased mobility  Rehab Prognosis: Good  Barriers to Discharge Home: Physical needs  Physical Needs:  (BP lability)  Evaluation/Treatment Tolerance:  (Limited due to BP)  Medical Staff Made Aware: Yes (Hopsital present and notified of BP readings)  End of Session Communication: Bedside nurse  Assessment Comment: Pt presents with impaired functional mobility s/p R THR with limited assessment due to BP changes with position changes and mobility MD aware. Recommend discharge home with 24 hour supervision/intermittent assistance and home health PT. Pt was issued HEP handout. Pt verbalized and demonstrated understanding. Pt was educated on posterior THR precautions. Pt was issued handout. Pt verbalized understanding.   End of Session Patient Position: Bed, 3 rail up, Alarm on, B SCDs on, ice to surgical site, call light in reach, needs met, RN aware.  IP OR SWING BED PT PLAN  Inpatient or Swing Bed: Inpatient  PT Plan  Treatment/Interventions: Bed mobility, Gait training, Transfer training, Stair training, Balance training, Endurance training, Strengthening, Therapeutic activity, Home exercise program, Therapeutic exercise, Range of motion, Positioning, Postural re-education  PT Plan: Ongoing PT  PT Frequency: BID  PT Discharge Recommendations: Low intensity level of continued care  Equipment Recommended upon Discharge: Wheeled walker, Straight cane  PT Recommended Transfer Status: Assistive device, Contact guard      Subjective     General Visit Information:  General  Reason for Referral: R THR  Referred By:   Vaibhav  Past Medical History Relevant to Rehab: OA, hashimoto's thyroiditis, heart murmur, syncope with pain  Family/Caregiver Present: No  Prior to Session Communication: Bedside nurse  Patient Position Received: Bed, 3 rail up, Alarm on  General Comment: .  Home Living:  Home Living  Type of Home: House  Lives With: Spouse (Spouse drives truck; best friend in x 4 days from [place])  Home Adaptive Equipment: Walker rolling or standard, Cane  Home Layout: One level  Home Access: Stairs to enter with rails  Entrance Stairs-Rails: Right  Entrance Stairs-Number of Steps: 2  Prior Level of Function:  Prior Function Per Pt/Caregiver Report  Level of [place]: Independent with ADLs and functional transfers, Independent with homemaking with ambulation  ADL Assistance: Independent  Homemaking Assistance: Independent  Ambulatory Assistance: Needs assistance (cane x few months)  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  Precautions:  Precautions  LE Weight Bearing Status: Weight Bearing as Tolerated  Medical Precautions: Fall precautions  Post-Surgical Precautions: Right hip precautions (posterior)    Vitals:  Seated BP: 141/89 mmhg; Supine flat BP s/p ambulation/toiletin/60mmhg. MD and RN aware.     Objective   Pain:  Pain Assessment  Pain Assessment: 0-10  0-10 (Numeric) Pain Score: 3  Pain Type: Surgical pain  Pain Location: Incision  Pain Orientation: Right  Pain Interventions: Cold applied, Repositioned  Cognition:  Cognition  Overall Cognitive Status: Within Functional Limits  Orientation Level: Oriented X4  Attention: Within Functional Limits  Memory: Within Funtional Limits  Problem Solving: Within Functional Limits  Numeric Reasoning: Within Functional Limits  Abstract Reasoning: Within Functional Limits  Safety/Judgement: Within Functional Limits  Insight: Within function limits    General Assessments:  Activity Tolerance  Endurance:  Tolerates less than 10 min exercise with changes in vital signs (see vitals for BP)    Sensation  Light Touch: No apparent deficits    Coordination  Movements are Fluid and Coordinated: Yes    Postural Control  Postural Control: Within Functional Limits    Static Sitting Balance  Static Sitting-Balance Support: Feet supported  Static Sitting-Level of Assistance: Close supervision  Dynamic Sitting Balance  Dynamic Sitting-Balance Support: Feet supported  Dynamic Sitting-Level of Assistance: Close supervision    Static Standing Balance  Static Standing-Balance Support: Bilateral upper extremity supported (with RW)  Static Standing-Level of Assistance: Contact guard  Dynamic Standing Balance  Dynamic Standing-Balance Support: Bilateral upper extremity supported (with RW)  Dynamic Standing-Level of Assistance: Contact guard  Functional Assessments:  ADL  ADL's Addressed: Yes  Toileting Assistance with Device: Minimal  Toileting Deficit: Steadying, Verbal cueing, Grab bar use  Functional Assistance: Minimal  Functional Deficit: Verbal cueing, Steadying, Toilet transfer    Bed Mobility  Bed Mobility: Yes  Bed Mobility 1  Bed Mobility 1: Supine to sitting, Sitting to supine  Level of Assistance 1: Minimum assistance (assist moving RLE OOB and back into bed)    Transfers  Transfer: Yes  Transfer 1  Transfer From 1: Bed to, Toilet to  Transfer to 1: Toilet, Bed  Technique 1: Sit to stand, Stand to sit  Transfer Device 1: Walker  Transfer Level of Assistance 1: Contact guard, Minimal verbal cues (cues for hand placement and RLE positioning to maintain THR precautions)    Ambulation/Gait Training  Ambulation/Gait Training Performed: Yes  Ambulation/Gait Training 1  Surface 1: Level tile  Device 1: Rolling walker  Assistance 1: Contact guard, Minimal verbal cues (cues for sequencing)  Quality of Gait 1: Decreased step length, Antalgic (decreased jesus, step to pattern, no LOB noted; limited due to lightheadedness in  standing)  Comments/Distance (ft) 1: 25 feet x 2  Extremity/Trunk Assessments:  RUE   RUE : Within Functional Limits  LUE   LUE: Within Functional Limits  RLE   RLE : Exceptions to WFL, hip ROM/strength limited due to post-op pain and surgeon restrictions.   LLE   LLE : Within Functional Limits  Treatments:  Therapeutic Exercise  Therapeutic Exercise Performed: Yes  B ankle pumps, R quad sets, R gluteal sets, R heel slides, R SAQ, and R hip abduction x 10 reps each.    Outcome Measures:  [facility] Basic Mobility  Turning from your back to your side while in a flat bed without using bedrails: A little  Moving from lying on your back to sitting on the side of a flat bed without using bedrails: A little  Moving to and from bed to chair (including a wheelchair): A little  Standing up from a chair using your arms (e.g. wheelchair or bedside chair): A little  To walk in hospital room: A little  Climbing 3-5 steps with railing: A little  Basic Mobility - Total Score: 18    Encounter Problems       Encounter Problems (Active)       Balance       LTG - Patient will demonstrate Intervention to enhance balance for safe completion of daily activities (Progressing)       Start:  02/03/25            LTG - Patient will maintain balance to allow for safe mobility (Progressing)       Start:  02/03/25               Compromised Skin Integrity       LTG - Patient will be free from infection (Progressing)       Start:  02/03/25               Mobility       LTG - Patient will recall and demonstrate 3 out of 3 total hip precautions during mobility (Progressing)       Start:  02/03/25            LTG - Patient will ambulate community distance with RW at a modified independent level.   (Progressing)       Start:  02/03/25            LTG - Patient will navigate 2 steps with 1 rail and with cane at a modified independent level.   (Progressing)       Start:  02/03/25               PT Transfers       LTG - Patient will demonstrate safe transfer  techniques with RW at a modified independent level.   (Progressing)       Start:  02/03/25               Pain          Safety       LTG - Patient will adhere to hip precautions during ADL's and transfers (Progressing)       Start:  02/03/25            LTG - Patient will demonstrate safety requirements appropriate to situation/environment (Progressing)       Start:  02/03/25                   Education Documentation  Handouts, taught by Essence Leary PT at 2/3/2025  2:00 PM.  Learner: Patient  Readiness: Acceptance  Method: Explanation, Demonstration, Handout  Response: Verbalizes Understanding, Demonstrated Understanding    Precautions, taught by Essence Leary PT at 2/3/2025  2:00 PM.  Learner: Patient  Readiness: Acceptance  Method: Explanation, Demonstration, Handout  Response: Verbalizes Understanding, Demonstrated Understanding    Body Mechanics, taught by Essence Leary PT at 2/3/2025  2:00 PM.  Learner: Patient  Readiness: Acceptance  Method: Explanation, Demonstration, Handout  Response: Verbalizes Understanding, Demonstrated Understanding    Home Exercise Program, taught by Essence Leary PT at 2/3/2025  2:00 PM.  Learner: Patient  Readiness: Acceptance  Method: Explanation, Demonstration, Handout  Response: Verbalizes Understanding, Demonstrated Understanding    Mobility Training, taught by Essence Leary PT at 2/3/2025  2:00 PM.  Learner: Patient  Readiness: Acceptance  Method: Explanation, Demonstration, Handout  Response: Verbalizes Understanding, Demonstrated Understanding    Education Comments  No comments found.    Essence Leary PT, DPT

## 2025-02-03 NOTE — NURSING NOTE
Assumed care of pt from PACU, RN . Pt transferred from cart to bed using draw sheet. Pt resting in bed with call light in reach. 2+ pulses and cap refill brisk. Surgical dressing dry and intact. Ice pack and SCD's applied. Lung sounds clear bilaterally. Pt states pain is 8/10 at this time, will medicate appropriately. Bed alarm activated. Family at the bedside.

## 2025-02-03 NOTE — OP NOTE
RIGHT TOTAL HIP ARTHROPLASTY     Date: 2/3/2025   OR Location: [facility]    Name: Gabriela Sanford  : 1965    MRN: [id number]    Pre op Diagnosis:  Pre-op Diagnosis      * Primary osteoarthritis of right hip [M16.11]      Post op Diagnosis: Pre-op Diagnosis      * Primary osteoarthritis of right hip [M16.11]    Procedures  ARTHROPLASTY, HIP, TOTAL, POSTERIOR APPROACH  05655 - TX ARTHRP ACETBLR/PROX FEM PROSTC AGRFT/ALGRFT      Surgeons      * Chris Esposito - Primary       Staff: Circulator: Lori Malcolm RN  Scrub Person: Azalia Gandhi SA; BING Irving; Angelica Petit     Drains and/or Catheters: none    Estimated Blood Loss: 75 cc    Resident/Fellow/Other Assistant:  Surgeons and Role:  * No surgeons found with a matching role *     Procedure Summary  Anesthesia: * No anesthesia type entered *    Anesthesia Staff: Anesthesiologist: Stephanie Ramirez MD MPH  CRNA: JW Mcmillan-CRNA   ASA: II       Intra-op Medications:   - 1 Gram Tranexamic acid prior to surgical incision  - 1 Gram Tranexamic acid at start of incision close  - MIO Pain cockail: ropivacaine-epinephrine-clonidine-ketorolac 2.46-0.005- 0.0008-0.3mg/mL periarticular syringe: 50 cc    IMPLANTS:   Implant Name Type Inv. Item Serial No.  Lot No. LRB No. Used Action   ACETABULAR CUP, SECTOR, GRIPTON, SIZE 56MM - [id number] Joint Hip ACETABULAR CUP, SECTOR, GRIPTON, SIZE 56MM  DEPUY [id number] Right 1 Implanted   LINER, ALTRX, +4, 10 DEGREE, 36 X 56MM - [id number] Joint Hip LINER, ALTRX, +4, 10 DEGREE, 36 X 56MM H DEPUY [id number] Right 1 Implanted   HIP STEM, CORAIL2, STD, SIZE 12 - [id number] Joint Hip HIP STEM, CORAIL2, STD, SIZE 12  DEPUY [id number] Right 1 Implanted   FEMORAL HEAD, CERAMIC 36 +5 - [id number] Joint Hip FEMORAL HEAD, CERAMIC 36 +5  DEPUY [id number] Right 1 Implanted       Findings: See operative note    Operative Indications:  Gabriela Sanford is a patient that is well-known to me and initially presented as an  outpatient. They have been treated for advanced hip osteoarthritis with therapy, anti-inflammatories, and activity modification, all without improvement of symptoms. They are here for surgery for Pre-op Diagnosis      * Primary osteoarthritis of right hip [M16.11].     We therefore reviewed the alternative option of elective total hip arthroplasty. The risks and benefits of this procedure were discussed in detail as an outpatient and are documented in our outpatient record. The patient expressed full understanding and wished to proceed. Informed consent was obtained and was placed on the medical chart    The risks, benefits and potential complications of the arthroplasty surgery were discussed with the patient in detail.  Risks including but not limited to the risk of anesthesia, DVT, pulmonary embolism with the possibility of death, retained infection, and neurovascular injury.  Specific details of the procedure, hospitalization, recovery, rehabilitation, and long-term precautions were also provided. Pre-operative teaching was provided. Implant/prosthesis selection was outlined, and the many options available were explained; the final choice will be made at the time of the procedure to match the anatomy and condition of the bone, ligaments, tendons, and muscles. Understanding of all topics was conveyed to me by the patient, and consent was given to proceed with a total hip arthroplasty.     On the day of surgery the site of surgery was properly noted/marked if necessary per policy. The patient has been actively warmed in preoperative area. Preoperative antibiotics were confirmed and started prior to surgical incision. Venous thrombosis prophylaxis have been ordered including bilateral sequential compression devices to start in pre-operative area.     At the time of surgery there was found to be severe osteoarthritis in the right hip with large osteophytes noted.    Procedure:    Patient was brought to the operative  suite satisfactory general anesthetic was administered by department esthesia she was placed in the lateral recumbent position with the right hip up landmarks identified incision was carried out over the posterior aspect of the right hip.  Dissection was carried down to the fascia the fascia was incised in similar fashion.  The gluteus juan musculature was split using blunt finger dissection.    Dissection carried down to the hip joint electric cautery was used to release the external rotator musculature and posterior bone this tissue was tagged using two #5 Ethibond sutures for closure at the end of the case.    The hip was dislocated posteriorly, femoral neck cut was performed approximately 1.0 cm proximal to the lesser trochanter using a sagittal saw the above pathology was noted.  A Ranawat retractor was placed anteriorly.  Acetabular reaming was performed starting at size 51 mm going up by 2 mm increments to size 55.  The size 55 reamer was found to fit well therefore a size 55 trial acetabulum was placed and found to fit well.    A size 56 [place] GRIPTION cup was implanted excellent fixation was noted.  A size 56 x 36 +4/10 degree polyethylene liner was secured in the acetabulum.  Good fixation was noted    She was directed to the femoral side where a box cutting broach followed by a femoral canal finder was used broaching was performed starting a size 8 going up by 1 mm increments to size 12 the size 12 was found to fit well.  A calcar planer was used to remove residual proximal bone.  Trialing was performed using a size 36+5 head which was found to recreate equal leg lengths and good stability.    Trial components were removed a size 12 collarless Corail stem was implanted.  Good fixation was noted.  A size 36+5 ceramic head was implanted.  Good fixation was appreciated.  The hip was reduced and taken through a full range of motion.  There was found to be good stability and equal leg  lengths.    Intraoperative ropivacaine Toradol and saline was injected for postoperative pain control.    2 drill holes were placed in the trochanteric region for reattachment of the external rotator musculature and posterior capsule, using the previously tag sutures the repair was accomplished.  At this time a layered closure was performed using 0 running lock #1 undyed Vicryl for the deep fascia 2-0 undyed Vicryl subcutaneous tissues and a 3 oh V-Loc in subcuticular fashion for the skin edge.    Patient was transferred to the PACU having tolerated procedure well.                                  Weight Bearing Status:  WBAT Bilateral LE                        Range of Motion: As tolerated                                             Discharge/Follow-up: Follow up in clinic in two weeks    Chris Esposito D.O.      This note was dictated using speech recognition software and was not corrected for spelling or grammatical errors

## 2025-02-03 NOTE — PERIOPERATIVE NURSING NOTE
Pt reports feeling better. Still states she is tired. No c/o feeling dizzy. Pt tolerates po well. Pain is controlled at this time. Report called to floor RN. Pt ready for transfer.

## 2025-02-03 NOTE — DISCHARGE INSTRUCTIONS
[facility]                                                   Landy Andrade MBA, BSN, RN-BC  - Orthopedic Program Navigator  Phone: [phone]    Chris Esopsito D.O.  Phone: [phone]     POSTOPERATIVE INSTRUCTIONS: TOTAL HIP & TOTAL KNEE ARTHROPLASTY     General/highlights: Flex/tighten the muscles in the buttocks, thighs and calves often when in chair or bed.  Utilize the incentive spirometer often especially the next 3 days.  Walk at least 10 steps every hour that you are awake.  Take stairs 1 at a time, good leg leads up, bed leg legs down, use the handrails.  You may be weightbearing as tolerated, in general the walker is used for 2 weeks.     PAIN, SWELLING & BRUISING  Some pain, stiffness and swelling is normal for up to 1 year after surgery.  Pain will start to let up over time depending on your activity level.  It is preferable to rest for 24 hours following your surgery  Pain is often delayed for 24-48 hours after surgery.  Pain may be dull/achy, throbbing, or even sharp/nerve sensations  It is normal for swelling and bruising to worsen before it gets better.  These symptoms usually peak 1 week after surgery  Swelling and bruising may appear throughout the leg, all the way down to your toes  Wear your compression stockings every day during the day for 14 days and remove them at night.  This will help control swelling     WOUND CARE INSTRUCTIONS  Your surgical bandage will be removed 2 weeks after surgery at your post-operative visit.  If your bandage becomes compromised, begins to come off before then, or soaks through with drainage call the office immediately.  You may have sutures under the skin that dissolve on their own over time.    As the sutures absorb occasionally a small suture abscess can develop, this is not uncommon for up to 6 weeks, if this occurs please notify your surgeon immediately.     HYGIENE  You may shower 48 hours after your surgery,  provided the Mepilex silver dressing is in place.  No tub bathing or submerging underwater.  Do not scrub directly over the surgical bandage  Do not use any creams, lotions or ointments on the surgical leg for 4 weeks after surgery, or until you have been cleared to do so by your surgeon     GENERAL INSTRUCTIONS  Do not drink alcoholic beverages (beer and wine included) for 24 hours following your surgery, or while you are taking narcotic pain medications  Delay making important decisions until you are fully recovered  You cannot swim or submerge in water for at least 6 weeks after surgery, or until you are cleared by your surgeon.  You may start kneeling 3 months after knee replacement surgery, once you have been cleared by your surgeon.  This may not ever feel “normal” or comfortable     HOME DIET  Resume your normal diet after surgery. If you are on a specific type of diet for your condition, resume that instead.    Choose foods that help promote good bowel habits and prevent constipation, such as foods high in fiber.        POSTOPERATIVE MEDICATIONS   Pain medications have been ordered to help manage pain throughout recovery.  While you are using narcotic pain medication, you should be using a stool softener or laxative to prevent constipation.  My preference is parallax powder once or twice a day when taking the narcotic pain medicine  It is important to eat a small meal or snack before taking pain medications to avoid nausea or stomach upset.     MEDICATION REFILLS - [phone]  If you need to request a medication refill, please call the office between 8:30am-4:30pm, Monday through Friday.    Any calls received outside of this timeframe will be handled on the next business day.    Medication requests received on Saturday or Sunday will be handled on Monday.    Please allow 3-5 business days for all medication requests to be processed.     RESTARTING HOME MEDICATIONS  You may restart your home medications the  "following day after your surgery UNLESS you have been given alternate instructions.    Follow the instructions given to you on your hospital discharge instructions for more information regarding your home medications.     ASSISTIVE DEVICE & MOVEMENT  Initially, you will use a walker or crutches to walk for the first 1-2 weeks.  Once your therapist feels you are ready, you will wean to one crutch or cane followed by no assistive device.  It is important to keep moving throughout recovery.  Walk at least 10 steps every hour that you are awake.  Stairs are part of your recovery, the \"good \"leg leads on the way up, the \"bad \"leg leads on the way down to, use the handrails and not the walker or crutches.  You should be up and walking around several times per day as well as bending your knee and making sure your knee is going completely straight (for knee replacements).  For anterior hip replacements avoid straight leg raise.     NUMBNESS/CLICKING   Decreased sensation or numbness is common on or around the area of the incision due to sensory nerves that are affected at the time of surgery.  The area of numbness will be lateral to the incision  You might always have numbness but the size of the area of numbness should decrease with time.  It is common for patients to have a “click” in the knee with movement.  This is usually nothing to be concerned about.  There are several reasons why your knee can be making these noises, including the implant components rubbing against each other, or a tendons going over a bony prominence.  Grinding can also occur and is not out of the ordinary.  Grinding can be caused by scar tissue formation  Noises will often settle over time once muscle strength improves.     DIFFICULTY SLEEPING  It is very common for patients to have difficulty sleeping at night which can be caused pain, medication, or feeling of anxiety.  Sleep disturbance typically worsens 4-6 weeks after surgery.  You are " permitted to sleep on your back or side with a  pillow between your legs for comfort      DRIVING & TRAVEL  Your surgeon will address this at your post-op appointment.  You must not be taking narcotic pain medication to be cleared to drive. (Usually at least 2 weeks for RIGHT limb and 3 weeks for LEFT limb before driving is permitted)  LEFT LEG JOINT REPLACMENT:  You may drive once you have regained full control of your leg, typically around 2 week after surgery  RIGHT LEG JOINT REPLACEMENT:  You must speak with your surgeon before you resume driving.  Driving can typically be resumed by 4-6 weeks after surgery.  During long distance travel, you should attempt to change position or stand every hour.  You should complete ankle pumps throughout your travel if you are sitting for long periods of time.  If traveling within the first 2 weeks after surgery, you should wear your compression stockings.     DENTAL & OTHER PROCEDURES     All patients must wait a minimum of 3 months for elective procedures, including routine dental cleanings.  For any dental appointment - cleaning or dental procedures - patients must take a prophylactic antibiotic 1 hour before the appointment.  You will also need to call for an antibiotic prior to any other invasive test, procedure, or surgery.  These prophylactic antibiotics will be needed for the rest of your life, in order to prevent infections.  Please call your surgeons office at [phone] to request the antibiotic.       PHYSICAL THERAPY     Following surgery it is important to progress through recovery with in-home or outpatient physical therapy.  You should continue to complete home exercises provided from the hospital on days that you are not working with a physical therapist.     It is common to have a temporary increase in pain and swelling upon starting outpatient physical therapy and/or changing your exercise routine.  Continue to use ice to help with symptoms.     FOLLOW-UP  APPOINTMENT - [phone]     Your post-surgical appointments will take place approximately 2 weeks, 6 weeks, 3 months and 1 year following surgery.  Joint replacements are monitored thereafter every 2-5 years for life.  If you have any questions or need to make changes to this appointment, please contact the office:     EMERGENCIES & WHEN TO CALL YOUR SURGEON  When to contact our office immediately:  Any falls or injury to the joint replacement  Fever >101.5 for at least 48 hours after surgery or chills.  Excessive bleeding from incision(s). A small amount of drainage is normal and expected.  Signs of infection of incision(s)-excessive drainage that is soaking through your dressing (especially if it is pus-like), redness that is spreading out from the edges of your incision, or increased warmth around the area.  Excruciating pain for which the pain medication, taken as instructed, is not helping.  Severe calf pain.  Go directly to the emergency room or call 911, if you are experiencing chest pain or difficulty breathing.    ICE/COLD THERAPY  Ice is most important during the first 2 weeks after surgery, but should be used for several weeks as needed.  Never place ice, or cold therapy devices directly on the skin.  You should always have a protective layer between your skin and the cold.  You have been prescribed to ice your total joint at a minimum of twice per hour for 20 minutes while awake during the first 6 weeks after surgery if you are using ice packs. This will help with pain control.      If you are using an ice machine, please follow ice machine instructions.  After knee replacement is extremely important to elevate the leg straight on an incline, not flat.     COLD THERAPY MACHINE RECOMMENDATIONS        Cold therapy devices can be used before and after surgery to assist in comfort and help to reduce pain and swelling.  These devices differ from ice or ice packs whereas the mechanism circulates water  through tubing and a pad to provide longer periods of cold therapy to the desired site.  While in the hospital, you can use your cold devices around the clock for optimal comfort.  We recommend using cold therapy after working with therapy or completing exercises on your own.  Once you are discharged home, there is no set schedule in which you must follow while using cold therapy.  Below are a few points to remember when using a cold therapy device:     Read the 's instructions prior to first the use.  Follow instructions for filling the cooler (water first, then ice).  Always make sure there is a layer of protection between the cold pad and your skin (Clothing, Towel, Ace Bandage, etc.)  Allow the device to circulate cold water throughout the pad prior wrapping the pad around your leg (approximately 10 minutes).  Place the pad on your leg in the desired position to meet your pain management needs and use the wraps provided to secure the pad to your body.  The purpose of this device is to use consistently throughout the day.  You do not need to need to use the 20 on, 20 off method when using an ice machine.  During waking hours, remove the cold pad every 1-2 hours to perform a skin check  Detach the pad from the cooler and ambulate at least once every hour  After removing the pad, allow at least 30 minutes before resuming cold therapy  You may wear the cold therapy device during periods of sleep including overnight    If you wake up during the night, you can check the skin at this time.  You do not need to wake up specifically to perform skin checks.  Empty the cooler and pad when device is not in use.  Follow 's instructions for cleaning your cold therapy device.     [facility] can assist with problems related to products purchased through the hospital  [phone] - Miguel Angel   or   [phone] - Zina

## 2025-02-03 NOTE — CONSULTS
Consults    Reason For Consult  Medical management for postop pain  History Of Present Illness  Gabriela Sanford is a 59 y.o. female presenting with right total hip replacement patient is hemodynamically stable upon arrival from PACU.     Past Medical History  She has no past medical history on file.    Surgical History  She has a past surgical history that includes Knee arthroscopy w/ arthrotomy (Left).     Social History  She reports that she quit smoking about 29 years ago. Her smoking use included cigarettes. She started smoking about 44 years ago. She has a 7.5 pack-year smoking history. She has never used smokeless tobacco. She reports current drug use. Drug: Other. No history on file for alcohol use.    Family History  No family history on file.     Allergies  Ampicillin, Codeine, Formaldehyde, Latex, Penicillins, and Cortisone    Review of Systems  10 system review noncontributory  Physical Exam  Patient is alert in no acute distress  Lungs are clear to auscultation and percussion  Cardiac is regular rate and rhythm normal S1-S2 without murmur gallop rub click S3 or S4  Abdomen is soft nontender positive bowel sounds there is no hepatosplenomegaly or CVA tenderness appreciated  Extremities without cyanosis clubbing erythema or edema  Operative site clean dry extremity warm pulses intact patient can dorsiflex plantarflex  Last Recorded Vitals  /89 (BP Location: Left arm, Patient Position: Sitting)   Pulse 73   Temp 37.1 °C (98.8 °F) (Temporal)   Resp 16   Wt 101 kg (222 lb 10.6 oz)   SpO2 99%     Relevant Results  Scheduled medications  acetaminophen, 650 mg, oral, q6h FEROZ  aspirin, 325 mg, oral, Daily  [START ON 2/4/2025] cholecalciferol, 2,000 Units, oral, Daily  ferrous sulfate (325 mg ferrous sulfate), 65 mg of iron, oral, Daily  ketorolac, 15 mg, intravenous, q6h FEROZ  polyethylene glycol, 17 g, oral, Daily      Continuous medications  lactated Ringer's, 100 mL/hr, Last Rate: 100 mL/hr (02/03/25  1302)  oxygen, 2 L/min      PRN medications  PRN medications: benzocaine-menthol, diphenhydrAMINE, HYDROcodone-acetaminophen    No results found for this or any previous visit (from the past 24 hours).       Assessment/Plan   Status post right total hip replacement  - Management per Ortho  - PT  - Pain control  - Supportive care    DVT prophylaxis  - Aspirin protocol  - SCD  - Ambulate    While doing PT patient became woozy and was placed back in bed and dizziness resolved however blood pressure was low 105/60 patient had no chest pain no chest pressure no shortness of breath no other symptoms will give Ringer's lactate 500 cc bolus and continue to follow      Kinjal Mathur MD

## 2025-02-03 NOTE — NURSING NOTE
Notified by physical therapist and MD Mathur that pt's BP was 105/60 mmHg and c/o dizziness with ambulation. MD Mathur at the bedside during incident and verbal orders given to start 500 mL bolus. Once pt returned to bed bolus was started. Once bolus completed BP was 123/79 mmHg. Pt denies any dizziness or feeling woozy. MD Mathur notified. No further orders at this time.

## 2025-02-03 NOTE — CARE PLAN
Problem: Balance  Goal: LTG - Patient will demonstrate Intervention to enhance balance for safe completion of daily activities  Outcome: Progressing  Goal: LTG - Patient will maintain balance to allow for safe mobility  Outcome: Progressing     Problem: Mobility  Goal: LTG - Patient will recall and demonstrate 3 out of 3 total hip precautions during mobility  Outcome: Progressing  Goal: LTG - Patient will ambulate community distance with RW at a modified independent level.    Outcome: Progressing  Goal: LTG - Patient will navigate 2 steps with 1 rail and with cane at a modified independent level.    Outcome: Progressing     Problem: Safety  Goal: LTG - Patient will adhere to hip precautions during ADL's and transfers  Outcome: Progressing  Goal: LTG - Patient will demonstrate safety requirements appropriate to situation/environment  Outcome: Progressing     Problem: PT Transfers  Goal: LTG - Patient will demonstrate safe transfer techniques with RW at a modified independent level.    Outcome: Progressing     Problem: Pain  Goal: LTG - Patient will manage pain with the appropriate technique/Intervention  Outcome: Progressing     Problem: Compromised Skin Integrity  Goal: LTG - Patient will be free from infection  Outcome: Progressing

## 2025-02-04 ENCOUNTER — PHARMACY VISIT (OUTPATIENT)
Dept: PHARMACY | Facility: CLINIC | Age: 60
End: 2025-02-04
Payer: COMMERCIAL

## 2025-02-04 ENCOUNTER — DOCUMENTATION (OUTPATIENT)
Dept: HOME HEALTH SERVICES | Facility: HOME HEALTH | Age: 60
End: 2025-02-04
Payer: COMMERCIAL

## 2025-02-04 VITALS
OXYGEN SATURATION: 96 % | RESPIRATION RATE: 18 BRPM | TEMPERATURE: 98.8 F | HEIGHT: 66 IN | BODY MASS INDEX: 35.79 KG/M2 | DIASTOLIC BLOOD PRESSURE: 71 MMHG | WEIGHT: 222.66 LBS | HEART RATE: 77 BPM | SYSTOLIC BLOOD PRESSURE: 121 MMHG

## 2025-02-04 LAB
ANION GAP SERPL CALC-SCNC: 11 MMOL/L (ref 10–20)
BUN SERPL-MCNC: 14 MG/DL (ref 6–23)
CALCIUM SERPL-MCNC: 8.5 MG/DL (ref 8.6–10.3)
CHLORIDE SERPL-SCNC: 101 MMOL/L (ref 98–107)
CO2 SERPL-SCNC: 26 MMOL/L (ref 21–32)
CREAT SERPL-MCNC: 0.58 MG/DL (ref 0.5–1.05)
EGFRCR SERPLBLD CKD-EPI 2021: >90 ML/MIN/1.73M*2
ERYTHROCYTE [DISTWIDTH] IN BLOOD BY AUTOMATED COUNT: 11.9 % (ref 11.5–14.5)
GLUCOSE SERPL-MCNC: 117 MG/DL (ref 74–99)
HCT VFR BLD AUTO: 32.8 % (ref 36–46)
HGB BLD-MCNC: 10.8 G/DL (ref 12–16)
MCH RBC QN AUTO: 29.7 PG (ref 26–34)
MCHC RBC AUTO-ENTMCNC: 32.9 G/DL (ref 32–36)
MCV RBC AUTO: 90 FL (ref 80–100)
NRBC BLD-RTO: ABNORMAL /100{WBCS}
PLATELET # BLD AUTO: 270 X10*3/UL (ref 150–450)
POTASSIUM SERPL-SCNC: 3.8 MMOL/L (ref 3.5–5.3)
RBC # BLD AUTO: 3.64 X10*6/UL (ref 4–5.2)
SODIUM SERPL-SCNC: 134 MMOL/L (ref 136–145)
WBC # BLD AUTO: 8.8 X10*3/UL (ref 4.4–11.3)

## 2025-02-04 PROCEDURE — RXMED WILLOW AMBULATORY MEDICATION CHARGE

## 2025-02-04 PROCEDURE — 97110 THERAPEUTIC EXERCISES: CPT | Mod: GP

## 2025-02-04 PROCEDURE — 2500000001 HC RX 250 WO HCPCS SELF ADMINISTERED DRUGS (ALT 637 FOR MEDICARE OP): Performed by: EMERGENCY MEDICINE

## 2025-02-04 PROCEDURE — 97530 THERAPEUTIC ACTIVITIES: CPT | Mod: GP

## 2025-02-04 PROCEDURE — 7100000011 HC EXTENDED STAY RECOVERY HOURLY - NURSING UNIT

## 2025-02-04 PROCEDURE — 85027 COMPLETE CBC AUTOMATED: CPT | Performed by: STUDENT IN AN ORGANIZED HEALTH CARE EDUCATION/TRAINING PROGRAM

## 2025-02-04 PROCEDURE — 80048 BASIC METABOLIC PNL TOTAL CA: CPT | Performed by: STUDENT IN AN ORGANIZED HEALTH CARE EDUCATION/TRAINING PROGRAM

## 2025-02-04 PROCEDURE — 2500000004 HC RX 250 GENERAL PHARMACY W/ HCPCS (ALT 636 FOR OP/ED): Performed by: ORTHOPAEDIC SURGERY

## 2025-02-04 PROCEDURE — 97116 GAIT TRAINING THERAPY: CPT | Mod: GP

## 2025-02-04 PROCEDURE — 36415 COLL VENOUS BLD VENIPUNCTURE: CPT | Performed by: STUDENT IN AN ORGANIZED HEALTH CARE EDUCATION/TRAINING PROGRAM

## 2025-02-04 PROCEDURE — 2500000001 HC RX 250 WO HCPCS SELF ADMINISTERED DRUGS (ALT 637 FOR MEDICARE OP): Performed by: ORTHOPAEDIC SURGERY

## 2025-02-04 PROCEDURE — 2500000001 HC RX 250 WO HCPCS SELF ADMINISTERED DRUGS (ALT 637 FOR MEDICARE OP): Performed by: STUDENT IN AN ORGANIZED HEALTH CARE EDUCATION/TRAINING PROGRAM

## 2025-02-04 PROCEDURE — 99221 1ST HOSP IP/OBS SF/LOW 40: CPT | Performed by: EMERGENCY MEDICINE

## 2025-02-04 RX ORDER — TRAMADOL HYDROCHLORIDE 50 MG/1
TABLET ORAL
Qty: 60 TABLET | Refills: 1 | OUTPATIENT
Start: 2025-02-04

## 2025-02-04 RX ADMIN — TRAMADOL HYDROCHLORIDE 50 MG: 50 TABLET ORAL at 10:52

## 2025-02-04 RX ADMIN — Medication 2000 UNITS: at 09:02

## 2025-02-04 RX ADMIN — ASPIRIN 325 MG: 325 TABLET ORAL at 09:02

## 2025-02-04 RX ADMIN — ACETAMINOPHEN 650 MG: 325 TABLET ORAL at 12:28

## 2025-02-04 RX ADMIN — ACETAMINOPHEN 650 MG: 325 TABLET ORAL at 06:04

## 2025-02-04 RX ADMIN — FERROUS SULFATE TAB 325 MG (65 MG ELEMENTAL FE) 325 MG: 325 (65 FE) TAB at 09:02

## 2025-02-04 RX ADMIN — KETOROLAC TROMETHAMINE 15 MG: 15 INJECTION, SOLUTION INTRAMUSCULAR; INTRAVENOUS at 06:04

## 2025-02-04 SDOH — HEALTH STABILITY: MENTAL HEALTH: HOW MANY DRINKS CONTAINING ALCOHOL DO YOU HAVE ON A TYPICAL DAY WHEN YOU ARE DRINKING?: 1 OR 2

## 2025-02-04 SDOH — HEALTH STABILITY: MENTAL HEALTH
DO YOU FEEL STRESS - TENSE, RESTLESS, NERVOUS, OR ANXIOUS, OR UNABLE TO SLEEP AT NIGHT BECAUSE YOUR MIND IS TROUBLED ALL THE TIME - THESE DAYS?: NOT AT ALL

## 2025-02-04 SDOH — SOCIAL STABILITY: SOCIAL NETWORK: HOW OFTEN DO YOU ATTEND CHURCH OR RELIGIOUS SERVICES?: 1 TO 4 TIMES PER YEAR

## 2025-02-04 SDOH — HEALTH STABILITY: MENTAL HEALTH: HOW OFTEN DO YOU HAVE SIX OR MORE DRINKS ON ONE OCCASION?: NEVER

## 2025-02-04 SDOH — ECONOMIC STABILITY: HOUSING INSECURITY: AT ANY TIME IN THE PAST 12 MONTHS, WERE YOU HOMELESS OR LIVING IN A SHELTER (INCLUDING NOW)?: NO

## 2025-02-04 SDOH — ECONOMIC STABILITY: FOOD INSECURITY: WITHIN THE PAST 12 MONTHS, THE FOOD YOU BOUGHT JUST DIDN'T LAST AND YOU DIDN'T HAVE MONEY TO GET MORE.: NEVER TRUE

## 2025-02-04 SDOH — SOCIAL STABILITY: SOCIAL INSECURITY
WITHIN THE LAST YEAR, HAVE YOU BEEN KICKED, HIT, SLAPPED, OR OTHERWISE PHYSICALLY HURT BY YOUR PARTNER OR EX-PARTNER?: NO

## 2025-02-04 SDOH — SOCIAL STABILITY: SOCIAL INSECURITY
WITHIN THE LAST YEAR, HAVE YOU BEEN RAPED OR FORCED TO HAVE ANY KIND OF SEXUAL ACTIVITY BY YOUR PARTNER OR EX-PARTNER?: NO

## 2025-02-04 SDOH — ECONOMIC STABILITY: HOUSING INSECURITY: IN THE LAST 12 MONTHS, WAS THERE A TIME WHEN YOU WERE NOT ABLE TO PAY THE MORTGAGE OR RENT ON TIME?: NO

## 2025-02-04 SDOH — SOCIAL STABILITY: SOCIAL NETWORK: HOW OFTEN DO YOU GET TOGETHER WITH FRIENDS OR RELATIVES?: ONCE A WEEK

## 2025-02-04 SDOH — SOCIAL STABILITY: SOCIAL INSECURITY: ARE YOU MARRIED, WIDOWED, DIVORCED, SEPARATED, NEVER MARRIED, OR LIVING WITH A PARTNER?: MARRIED

## 2025-02-04 SDOH — HEALTH STABILITY: PHYSICAL HEALTH
HOW OFTEN DO YOU NEED TO HAVE SOMEONE HELP YOU WHEN YOU READ INSTRUCTIONS, PAMPHLETS, OR OTHER WRITTEN MATERIAL FROM YOUR DOCTOR OR PHARMACY?: NEVER

## 2025-02-04 SDOH — HEALTH STABILITY: PHYSICAL HEALTH: ON AVERAGE, HOW MANY DAYS PER WEEK DO YOU ENGAGE IN MODERATE TO STRENUOUS EXERCISE (LIKE A BRISK WALK)?: 0 DAYS

## 2025-02-04 SDOH — ECONOMIC STABILITY: FOOD INSECURITY: WITHIN THE PAST 12 MONTHS, YOU WORRIED THAT YOUR FOOD WOULD RUN OUT BEFORE YOU GOT THE MONEY TO BUY MORE.: NEVER TRUE

## 2025-02-04 SDOH — ECONOMIC STABILITY: FOOD INSECURITY: HOW HARD IS IT FOR YOU TO PAY FOR THE VERY BASICS LIKE FOOD, HOUSING, MEDICAL CARE, AND HEATING?: NOT HARD AT ALL

## 2025-02-04 SDOH — SOCIAL STABILITY: SOCIAL NETWORK
DO YOU BELONG TO ANY CLUBS OR ORGANIZATIONS SUCH AS CHURCH GROUPS, UNIONS, FRATERNAL OR ATHLETIC GROUPS, OR SCHOOL GROUPS?: NO

## 2025-02-04 SDOH — ECONOMIC STABILITY: INCOME INSECURITY: IN THE PAST 12 MONTHS HAS THE ELECTRIC, GAS, OIL, OR WATER COMPANY THREATENED TO SHUT OFF SERVICES IN YOUR HOME?: NO

## 2025-02-04 SDOH — SOCIAL STABILITY: SOCIAL INSECURITY: WITHIN THE LAST YEAR, HAVE YOU BEEN HUMILIATED OR EMOTIONALLY ABUSED IN OTHER WAYS BY YOUR PARTNER OR EX-PARTNER?: NO

## 2025-02-04 SDOH — SOCIAL STABILITY: SOCIAL NETWORK: IN A TYPICAL WEEK, HOW MANY TIMES DO YOU TALK ON THE PHONE WITH FAMILY, FRIENDS, OR NEIGHBORS?: THREE TIMES A WEEK

## 2025-02-04 SDOH — HEALTH STABILITY: MENTAL HEALTH: HOW OFTEN DO YOU HAVE A DRINK CONTAINING ALCOHOL?: MONTHLY OR LESS

## 2025-02-04 SDOH — SOCIAL STABILITY: SOCIAL INSECURITY: WITHIN THE LAST YEAR, HAVE YOU BEEN AFRAID OF YOUR PARTNER OR EX-PARTNER?: NO

## 2025-02-04 SDOH — SOCIAL STABILITY: SOCIAL NETWORK: HOW OFTEN DO YOU ATTEND MEETINGS OF THE CLUBS OR ORGANIZATIONS YOU BELONG TO?: 1 TO 4 TIMES PER YEAR

## 2025-02-04 SDOH — ECONOMIC STABILITY: HOUSING INSECURITY: IN THE PAST 12 MONTHS, HOW MANY TIMES HAVE YOU MOVED WHERE YOU WERE LIVING?: 0

## 2025-02-04 SDOH — HEALTH STABILITY: PHYSICAL HEALTH: ON AVERAGE, HOW MANY MINUTES DO YOU ENGAGE IN EXERCISE AT THIS LEVEL?: 0 MIN

## 2025-02-04 SDOH — ECONOMIC STABILITY: TRANSPORTATION INSECURITY: IN THE PAST 12 MONTHS, HAS LACK OF TRANSPORTATION KEPT YOU FROM MEDICAL APPOINTMENTS OR FROM GETTING MEDICATIONS?: NO

## 2025-02-04 ASSESSMENT — PAIN SCALES - GENERAL
PAINLEVEL_OUTOF10: 0 - NO PAIN
PAINLEVEL_OUTOF10: 5 - MODERATE PAIN
PAINLEVEL_OUTOF10: 8
PAINLEVEL_OUTOF10: 0 - NO PAIN
PAINLEVEL_OUTOF10: 4

## 2025-02-04 ASSESSMENT — COGNITIVE AND FUNCTIONAL STATUS - GENERAL
DAILY ACTIVITIY SCORE: 21
MOVING FROM LYING ON BACK TO SITTING ON SIDE OF FLAT BED WITH BEDRAILS: A LITTLE
MOVING FROM LYING ON BACK TO SITTING ON SIDE OF FLAT BED WITH BEDRAILS: A LITTLE
MOBILITY SCORE: 18
MOVING TO AND FROM BED TO CHAIR: A LITTLE
TOILETING: A LITTLE
HELP NEEDED FOR BATHING: A LITTLE
CLIMB 3 TO 5 STEPS WITH RAILING: A LITTLE
MOBILITY SCORE: 21
WALKING IN HOSPITAL ROOM: A LITTLE
TURNING FROM BACK TO SIDE WHILE IN FLAT BAD: A LITTLE
STANDING UP FROM CHAIR USING ARMS: A LITTLE
CLIMB 3 TO 5 STEPS WITH RAILING: A LITTLE
TURNING FROM BACK TO SIDE WHILE IN FLAT BAD: A LITTLE
DRESSING REGULAR LOWER BODY CLOTHING: A LITTLE

## 2025-02-04 ASSESSMENT — PAIN - FUNCTIONAL ASSESSMENT
PAIN_FUNCTIONAL_ASSESSMENT: 0-10

## 2025-02-04 ASSESSMENT — PAIN DESCRIPTION - LOCATION
LOCATION: HIP
LOCATION: HIP

## 2025-02-04 ASSESSMENT — PAIN DESCRIPTION - ORIENTATION
ORIENTATION: RIGHT
ORIENTATION: RIGHT

## 2025-02-04 ASSESSMENT — ACTIVITIES OF DAILY LIVING (ADL): LACK_OF_TRANSPORTATION: NO

## 2025-02-04 ASSESSMENT — PAIN DESCRIPTION - DESCRIPTORS
DESCRIPTORS: SPASM
DESCRIPTORS: ACHING;DISCOMFORT

## 2025-02-04 ASSESSMENT — LIFESTYLE VARIABLES
AUDIT-C TOTAL SCORE: 1
SKIP TO QUESTIONS 9-10: 1

## 2025-02-04 NOTE — NURSING NOTE
0730am Assumed care of pt, A/Ox 4 denies any c/o pains no distress noted. Pt has right hip dressing dry and intact with an ice pack in place. Pt assisted to bathroom and then to recliner with call light placed in reach and encouraged to order breakfast.

## 2025-02-04 NOTE — CARE PLAN
Problem: Pain  Goal: Takes deep breaths with improved pain control throughout the shift  Outcome: Progressing  Goal: Turns in bed with improved pain control throughout the shift  Outcome: Progressing  Goal: Walks with improved pain control throughout the shift  Outcome: Progressing  Goal: Performs ADL's with improved pain control throughout shift  Outcome: Progressing  Goal: Participates in PT with improved pain control throughout the shift  Outcome: Progressing  Goal: Free from opioid side effects throughout the shift  Outcome: Progressing  Goal: Free from acute confusion related to pain meds throughout the shift  Outcome: Progressing     Problem: Fall/Injury  Goal: Not fall by end of shift  Outcome: Progressing  Goal: Be free from injury by end of the shift  Outcome: Progressing  Goal: Verbalize understanding of personal risk factors for fall in the hospital  Outcome: Progressing  Goal: Verbalize understanding of risk factor reduction measures to prevent injury from fall in the home  Outcome: Progressing  Goal: Use assistive devices by end of the shift  Outcome: Progressing  Goal: Pace activities to prevent fatigue by end of the shift  Outcome: Progressing     Problem: Deep Vein Thrombosis  Goal: I will remain free from complications of deep vein thrombosis and maintain current level of mobility  Outcome: Progressing     Problem: Pain  Goal: LTG - Patient will manage pain with the appropriate technique/Intervention  Outcome: Progressing     Problem: Pain - Adult  Goal: Verbalizes/displays adequate comfort level or baseline comfort level  Outcome: Progressing     Problem: Safety - Adult  Goal: Free from fall injury  Outcome: Progressing     Problem: Discharge Planning  Goal: Discharge to home or other facility with appropriate resources  Outcome: Progressing     Problem: Chronic Conditions and Co-morbidities  Goal: Patient's chronic conditions and co-morbidity symptoms are monitored and maintained or  improved  Outcome: Progressing     Problem: Nutrition  Goal: Nutrient intake appropriate for maintaining nutritional needs  Outcome: Progressing   The patient's goals for the shift include      The clinical goals for the shift include pain control, safety

## 2025-02-04 NOTE — CARE PLAN
Pt POD#1 right hip replacement   Plan is home today with [facility] PT/OT.  SOC confirmed on 2/5/25.

## 2025-02-04 NOTE — NURSING NOTE
Rounded on patient. She is currently laying in bed and complaining of being dizzy. She told me that each time she tries to stand her dizziness get worse, She said that PT had walked her to the bathroom but she couldn't urinate. She currently does feel the need to void but has warm compresses to her belly and would like to wait a little while to try again. She said that everyone has been doing a great job. I informed her that we will be dong hourly rounding on her per our policy. I also told her that if she needs anything not to hesitate to push her call button and someone will be right in to help. Continuing to monitor.

## 2025-02-04 NOTE — PROGRESS NOTES
Medication Education     Medication education for Gabriela Sanford was provided to the patient  for the following medication(s):    ASA  Tramadol      Medication education provided by a Pharmacist:  Dose, frequency, storage How to take and what to do if a dose is missed Proper dose, indication, possible ADRs How the medication works and benefits of taking it    Identified potential barriers to education:  None    Method(s) of Education:  Verbal Written materials provided and reviewed    An opportunity to ask questions and receive answers was provided.     Assessment of understanding the patient :  2= meets goals/outcomes    Additional Notes (if applicable): meds 2 beds delivered to patient    Cynthia Rivers, DannaD

## 2025-02-04 NOTE — HH CARE COORDINATION
Home Care received a Referral for Physical Therapy and Occupational Therapy. We have processed the referral for a Start of Care on 02/05.     If you have any questions or concerns, please feel free to contact us at [phone]. Follow the prompts, enter your five digit zip code, and you will be directed to your care team on [facility].

## 2025-02-04 NOTE — NURSING NOTE
Pt. having c/o pain to the R hip, rating it a 7/10, and nausea. IV Zofran 4 mg given @ 2155. Pt. was then offered a PRN dose of Norco, which she refused d/t her codeine allergy. Hospitalist made aware, new orders placed for PRN Tramadol and Flexeril. Pt. is refusing both at this time, states that she would rather wait until the scheduled doses of Tylenol and Toradol are due again. R leg was elevated on a pillow and ice pack was put back into place. Pt. stated that she had removed it earlier d/t being cold. A warm blanket was provided. Pt. denies further needs at this time. Call light is within reach, bed alarm activated.

## 2025-02-04 NOTE — PROGRESS NOTES
Patient seen, chart reviewed.  Complaining of lightheadedness and heartburn.  Refusing Norco due to codeine allergy.  Willing to try tramadol, order placed.  Vital signs are stable.      Ekta Vasquez MD

## 2025-02-04 NOTE — PROGRESS NOTES
Interdisciplinary Rounds were completed at the bedside with Patient.  Staff participating in rounds included: Clinical Nurse Orthopedic Coordinator Transitional Care Coordinator Nursing Leadership.  Topics discussed included: Today's Plan of Care Discharge Plan and Accommodations Physical Therapy Medications/Preferred Pharmacy and the Patient was given the opportunity to ask additional questions or bring up any concerns at that time.  During the final discharge discussion and review of instructions, they will have another opportunity to review questions or concerns prior to leaving our care.  Patient was given information on who to call post-discharge should new questions or concerns arise.      At the time of this discussion, the patient's plan includes:    Discharge Date/Disposition:  Home, Today with, Home Care Services  Discharge Needs: No Equipment Needs Identified  Medications/Pharmacy: [id number] service utilized for discharge prescriptions through  [facility]

## 2025-02-04 NOTE — CARE PLAN
The patient's goals for the shift include  pain control    The clinical goals for the shift include pain control, safety

## 2025-02-04 NOTE — NURSING NOTE
0155pm Pt and  given written and verbal discharge instructions with verbalized understanding. Pt's iv heplock removed without difficulty. Pt escorted down via wheelchair for discharge home with meds to bed and all personal belongings accompanying.

## 2025-02-04 NOTE — NURSING NOTE
Pt. Vital signs obtained.  Pt. Sitting in the chair.  Pt. Eating breakfast.  Call light within reach.

## 2025-02-04 NOTE — CONSULTS
Consults    Reason For Consult  Medical management for postop pain    History Of Present Illness  Gabriela Sanford is a 59 y.o. female presenting with right total hip replacement patient has remained hemodynamically stable postoperatively and is medically stable for discharge to home  Past Medical History  She has no past medical history on file.    Surgical History  She has a past surgical history that includes Knee arthroscopy w/ arthrotomy (Left).     Social History  She reports that she quit smoking about 29 years ago. Her smoking use included cigarettes. She started smoking about 44 years ago. She has a 7.5 pack-year smoking history. She has never used smokeless tobacco. She reports current drug use. Drug: Other. No history on file for alcohol use.    Family History  No family history on file.     Allergies  Ampicillin, Codeine, Formaldehyde, Latex, Penicillins, and Cortisone    Review of Systems  10 system review noncontributory   Physical Exam  Patient is alert in no acute distress  Lungs are clear to auscultation and percussion  Cardiac is regular rate and rhythm normal S1-S2 without murmur gallop rub click S3 or S4  Abdomen is soft nontender positive bowel sounds there is no hepatosplenomegaly or CVA tenderness appreciated  Extremities without cyanosis clubbing erythema or edema  Operative site per Ortho extremities warm pulses are intact patient can dorsiflex plantarflex  Last Recorded Vitals  /71 (BP Location: Left arm, Patient Position: Sitting)   Pulse 77   Temp 37.1 °C (98.8 °F) (Temporal)   Resp 18   Wt 101 kg (222 lb 10.6 oz)   SpO2 96%     Relevant Results  Scheduled medications  acetaminophen, 650 mg, oral, q6h FEROZ  aspirin, 325 mg, oral, Daily  cholecalciferol, 2,000 Units, oral, Daily  ferrous sulfate (325 mg ferrous sulfate), 65 mg of iron, oral, Daily  polyethylene glycol, 17 g, oral, Daily      Continuous medications  lactated Ringer's, 100 mL/hr, Last Rate: 100 mL/hr (02/04/25  0514)      PRN medications  PRN medications: benzocaine-menthol, calcium carbonate, cyclobenzaprine, diphenhydrAMINE, docusate sodium, lubricating eye drops, melatonin, ondansetron, oxygen, simethicone, sodium chloride, traMADol    Results for orders placed or performed during the hospital encounter of 02/03/25 (from the past 24 hours)   Basic Metabolic Panel   Result Value Ref Range    Glucose 117 (H) 74 - 99 mg/dL    Sodium 134 (L) 136 - 145 mmol/L    Potassium 3.8 3.5 - 5.3 mmol/L    Chloride 101 98 - 107 mmol/L    Bicarbonate 26 21 - 32 mmol/L    Anion Gap 11 10 - 20 mmol/L    Urea Nitrogen 14 6 - 23 mg/dL    Creatinine 0.58 0.50 - 1.05 mg/dL    eGFR >90 >60 mL/min/1.73m*2    Calcium 8.5 (L) 8.6 - 10.3 mg/dL   CBC   Result Value Ref Range    WBC 8.8 4.4 - 11.3 x10*3/uL    nRBC      RBC 3.64 (L) 4.00 - 5.20 x10*6/uL    Hemoglobin 10.8 (L) 12.0 - 16.0 g/dL    Hematocrit 32.8 (L) 36.0 - 46.0 %    MCV 90 80 - 100 fL    MCH 29.7 26.0 - 34.0 pg    MCHC 32.9 32.0 - 36.0 g/dL    RDW 11.9 11.5 - 14.5 %    Platelets 270 150 - 450 x10*3/uL          Assessment/Plan   Status post right total hip replacement  - Management per Ortho  - PT  - Pain control  - Supportive care     DVT prophylaxis  - Aspirin protocol  - SCD  - Ambulate  Patient is clinically and medically stable for discharge to home      Kinjal Mathur MD

## 2025-02-05 NOTE — PROGRESS NOTES
Physical Therapy Treatment    Patient Name: Gabriela Sanford  MRN: [id number]  Department:   Room: [address] [place] [address]  Today's Date: 2/4/2025  Time Calculation  Start Time: 1041  Stop Time: 1131  Time Calculation (min): 50 min         Assessment/Plan   PT Assessment  PT Assessment Results: Decreased strength, Decreased range of motion, Decreased endurance, Impaired balance, Orthopedic restrictions, Pain  Rehab Prognosis: Good  Barriers to Discharge Home: No anticipated barriers  Evaluation/Treatment Tolerance: Patient tolerated treatment well  End of Session Communication: Bedside nurse  Assessment Comment: Pt ambulated increased distance this date without lightheadedness/dizziness. Pt is prepared for discharge home with assist from her friend who is in town and PT.  End of Session Patient Position: Up in chair, BLE elevated, ice to surgical site, call light in reach, needs met, RN aware.  PT Plan  Inpatient/Swing Bed or Outpatient: Inpatient  PT Plan  Treatment/Interventions: Bed mobility, Transfer training, Balance training, Stair training, Gait training, Strengthening, Endurance training, Therapeutic activity, Range of motion, Therapeutic exercise, Home exercise program, Positioning, Postural re-education  PT Plan: Ongoing PT  PT Frequency: BID  PT Discharge Recommendations: Low intensity level of continued care  Equipment Recommended upon Discharge: Wheeled walker, Straight cane  PT Recommended Transfer Status: Stand by assist, Assistive device  PT - OK to Discharge: Yes      General Visit Information:   PT  Visit  PT Received On: 02/04/25  Response to Previous Treatment: Patient with no complaints from previous session.  General  Family/Caregiver Present: No  Prior to Session Communication: Bedside nurse  Patient Position Received: Up in chair    Subjective   Precautions:  Precautions  LE Weight Bearing Status: Weight Bearing as Tolerated  Medical Precautions: Fall precautions  Post-Surgical Precautions: Right hip  precautions (posterior approach)            Objective   Pain:  Pain Assessment  Pain Assessment: 0-10  0-10 (Numeric) Pain Score: 4  Pain Type: Surgical pain  Pain Location: Hip  Pain Orientation: Right  Pain Interventions: Medication (See MAR), Cold applied (RN brought in meds at end of PT session)    Activity Tolerance:  Activity Tolerance  Endurance: Tolerates 30+ min exercise without fatigue  Treatments:  Therapeutic Exercise  Therapeutic Exercise Performed: Yes  Therapeutic Exercise Activity 1: B ankle pumps, R quad sets, R gluteal sets, R heel slides, R SAQ, and R hip abduction x 10 reps each.    Therapeutic Activity  Therapeutic Activity Performed: Yes  Therapeutic Activity 1: PT instructed patient in and assisted patient in donning underwear and pants onto surgical LE first, in order to maintain THR precautions.    Ambulation/Gait Training  Ambulation/Gait Training Performed: Yes  Ambulation/Gait Training 1  Surface 1: Level tile  Device 1: Rolling walker  Assistance 1: Close supervision  Quality of Gait 1: Decreased step length, Antalgic (decreased jesus, step to pattern, no LOB noted)  Comments/Distance (ft) 1: 75 feet x 2    Transfers  Transfer: Yes  Transfer 1  Transfer From 1: Chair with arms to, Wheelchair to  Transfer to 1: Wheelchair, Chair with arms  Technique 1: Sit to stand, Stand to sit  Transfer Device 1: Walker  Transfer Level of Assistance 1: Close supervision, Minimal verbal cues (cues for hand placement and RLE positioning)    Stairs  Stairs: Yes  Stairs  Rails 1: Right  Device 1: Single point cane  Assistance 1: Close supervision, Minimal verbal cues (cues for sequencing)  Comment/Number of Steps 1: 3  Pt demonstrated step to pattern, decreased jesus, no LOB noted.     Outcome Measures:  [facility] Basic Mobility  Turning from your back to your side while in a flat bed without using bedrails: A little  Moving from lying on your back to sitting on the side of a flat bed without using  bedrails: A little  Moving to and from bed to chair (including a wheelchair): None  Standing up from a chair using your arms (e.g. wheelchair or bedside chair): None  To walk in hospital room: None  Climbing 3-5 steps with railing: A little  Basic Mobility - Total Score: 21    Essence Leary, PT, DPT

## 2025-02-06 ENCOUNTER — HOME CARE VISIT (OUTPATIENT)
Dept: HOME HEALTH SERVICES | Facility: HOME HEALTH | Age: 60
End: 2025-02-06
Payer: COMMERCIAL

## 2025-02-06 PROCEDURE — G0152 HHCP-SERV OF OT,EA 15 MIN: HCPCS

## 2025-02-06 PROCEDURE — G0151 HHCP-SERV OF PT,EA 15 MIN: HCPCS

## 2025-02-06 SDOH — HEALTH STABILITY: PHYSICAL HEALTH: EXERCISE COMMENTS: THA PROTOCOL: X 10  -ANKLE PUMPS  -QUAD SETS  -GLUTE SETS  -HEEL SLIDES  -HIP ABD/ADD  -SAQ  -LAQ

## 2025-02-06 ASSESSMENT — ACTIVITIES OF DAILY LIVING (ADL)
PHYSICAL TRANSFERS ASSESSED: 1
AMBULATION ASSISTANCE ON FLAT SURFACES: 1
AMBULATION_DISTANCE/DURATION_TOLERATED: 100 FEET
CURRENT_FUNCTION: MINIMUM ASSIST
DRESSING_LB_CURRENT_FUNCTION: SUPERVISION
BATHING_CURRENT_FUNCTION: MINIMUM ASSIST
ENTERING_EXITING_HOME: CONTACT GUARD ASSIST
PREPARING MEALS: NEEDS ASSISTANCE
BATHING ASSESSED: 1
PHYSICAL_TRANSFERS_DEVICES: FWW
OASIS_M1830: 03

## 2025-02-06 ASSESSMENT — ENCOUNTER SYMPTOMS
PERSON REPORTING PAIN: PATIENT
PAIN LOCATION: RIGHT HIP
PAIN: 1
PAIN LOCATION - PAIN SEVERITY: 0/10
PAIN LOCATION - EXACERBATING FACTORS: R THA
MUSCLE WEAKNESS: 1
PAIN LOCATION: RIGHT HIP
LIMITED RANGE OF MOTION: 1
LOWEST PAIN SEVERITY IN PAST 24 HOURS: 0/10
PAIN LOCATION - PAIN QUALITY: ACHY
PAIN: 1
PAIN LOCATION - PAIN FREQUENCY: INTERMITTENT
PAIN LOCATION - RELIEVING FACTORS: REST, PAIN MEDS
ARTHRALGIAS: 1
PERSON REPORTING PAIN: PATIENT
PAIN SEVERITY GOAL: 0/10
SUBJECTIVE PAIN PROGRESSION: GRADUALLY IMPROVING
HIGHEST PAIN SEVERITY IN PAST 24 HOURS: 4/10

## 2025-02-07 NOTE — DISCHARGE SUMMARY
Discharge Diagnosis  Primary osteoarthritis of right hip    Issues Requiring Follow-Up  R THR    Test Results Pending At Discharge  Pending Labs       No current pending labs.            Hospital Course   unevntful    Pertinent Physical Exam At Time of Discharge  Physical Exam    Home Medications     Medication List      START taking these medications     traMADol 50 mg tablet; Commonly known as: Ultram; Take 1-2  tablet by   mouth every 8 hours as needed     CONTINUE taking these medications     aspirin 325 mg tablet; take 1 tablet by mouth everyday   b complex 0.4 mg tablet   diphenhydrAMINE 25 mg capsule; Commonly known as: BENADryl   ferrous sulfate (325 mg ferrous sulfate) tablet   HYDROcodone-acetaminophen 5-325 mg tablet; Commonly known as: Norco;   Take 2 tablet by mouth every 8 hours as needed for pain   medical cannabis   VITAMIN D3 ORAL     ASK your doctor about these medications     fluticasone 50 mcg/actuation nasal spray; Commonly known as: Flonase       Outpatient Follow-Up  Future Appointments   Date Time Provider Department Center   2/11/2025 To Be Determined Ankita Tucker [facility]   2/11/2025 To Be Determined Filomena Lim [facility]   2/13/2025 To Be Determined Ankita Tucker [facility]   2/14/2025 To Be Determined Filomena Lim [facility]   2/19/2025 To Be Determined Jamel Dawson [facility]   3/21/2025  2:20 PM Price Salazar MD [id number] None       Chris Esposito DO

## 2025-02-10 ENCOUNTER — HOME CARE VISIT (OUTPATIENT)
Dept: HOME HEALTH SERVICES | Facility: HOME HEALTH | Age: 60
End: 2025-02-10
Payer: COMMERCIAL

## 2025-02-10 PROCEDURE — G0157 HHC PT ASSISTANT EA 15: HCPCS | Mod: CQ

## 2025-02-11 ENCOUNTER — HOME CARE VISIT (OUTPATIENT)
Dept: HOME HEALTH SERVICES | Facility: HOME HEALTH | Age: 60
End: 2025-02-11
Payer: COMMERCIAL

## 2025-02-11 PROCEDURE — G0158 HHC OT ASSISTANT EA 15: HCPCS | Mod: CO

## 2025-02-11 ASSESSMENT — ENCOUNTER SYMPTOMS
PAIN: 1
PAIN LOCATION - PAIN SEVERITY: 2/10
PAIN LOCATION - PAIN SEVERITY: 2/10
PAIN LOCATION: RIGHT HIP
PAIN: 1
LOWEST PAIN SEVERITY IN PAST 24 HOURS: 1/10
PAIN SEVERITY GOAL: 0/10
PAIN LOCATION - EXACERBATING FACTORS: ACTIVITY, TIME OF DAY
LOWEST PAIN SEVERITY IN PAST 24 HOURS: 0/10
HIGHEST PAIN SEVERITY IN PAST 24 HOURS: 5/10
HIGHEST PAIN SEVERITY IN PAST 24 HOURS: 2/10
PERSON REPORTING PAIN: PATIENT
SUBJECTIVE PAIN PROGRESSION: WAXING AND WANING
PAIN LOCATION: RIGHT HIP
PAIN SEVERITY GOAL: 0/10
PAIN LOCATION - PAIN DURATION: SINCE SURGERY
PAIN LOCATION - PAIN FREQUENCY: CONSTANT
PAIN LOCATION - RELIEVING FACTORS: REST ICE
PERSON REPORTING PAIN: PATIENT
PAIN LOCATION - PAIN QUALITY: ACHING, TIGHT
SUBJECTIVE PAIN PROGRESSION: GRADUALLY IMPROVING
PAIN LOCATION - PAIN QUALITY: SORE

## 2025-02-13 ENCOUNTER — HOME CARE VISIT (OUTPATIENT)
Dept: HOME HEALTH SERVICES | Facility: HOME HEALTH | Age: 60
End: 2025-02-13
Payer: COMMERCIAL

## 2025-02-13 PROCEDURE — G0157 HHC PT ASSISTANT EA 15: HCPCS | Mod: CQ

## 2025-02-13 ASSESSMENT — ENCOUNTER SYMPTOMS
PAIN LOCATION - PAIN FREQUENCY: CONSTANT
PAIN LOCATION: RIGHT HIP
PAIN LOCATION - EXACERBATING FACTORS: ACTIVITY, TIME OF DAY
PERSON REPORTING PAIN: PATIENT
HIGHEST PAIN SEVERITY IN PAST 24 HOURS: 7/10
PAIN: 1
LOWEST PAIN SEVERITY IN PAST 24 HOURS: 3/10
PAIN LOCATION - PAIN SEVERITY: 4/10
SUBJECTIVE PAIN PROGRESSION: WAXING AND WANING
PAIN SEVERITY GOAL: 0/10
PAIN LOCATION - PAIN QUALITY: ACHING
PAIN LOCATION - PAIN DURATION: SINCE SURGERY

## 2025-02-17 ENCOUNTER — HOME CARE VISIT (OUTPATIENT)
Dept: HOME HEALTH SERVICES | Facility: HOME HEALTH | Age: 60
End: 2025-02-17
Payer: COMMERCIAL

## 2025-02-17 VITALS
TEMPERATURE: 97.3 F | DIASTOLIC BLOOD PRESSURE: 80 MMHG | SYSTOLIC BLOOD PRESSURE: 122 MMHG | HEART RATE: 72 BPM | RESPIRATION RATE: 12 BRPM

## 2025-02-17 PROCEDURE — G0157 HHC PT ASSISTANT EA 15: HCPCS | Mod: CQ

## 2025-02-17 ASSESSMENT — ENCOUNTER SYMPTOMS
PAIN LOCATION: RIGHT HIP
PAIN LOCATION - EXACERBATING FACTORS: ACTIVITY
PAIN LOCATION - PAIN DURATION: SINCE SURGERY
PAIN LOCATION - PAIN QUALITY: ACHING
PAIN LOCATION - PAIN FREQUENCY: FREQUENT
PERSON REPORTING PAIN: PATIENT
PAIN LOCATION - PAIN SEVERITY: 3/10
PAIN: 1

## 2025-02-17 NOTE — CASE COMMUNICATION
Feel that patient will benefit from additional home PT. She has been having some issues with lightheadedness that she suspects was from her Tramadol which she has stopped 2 days ago because it was dropping her blood pressure. She is also trying to drink enough water and gatorade to prevent dehydration. Not able to safely drive yet and does not have anyone who can regularlly take her to outpatient so will not be able to make transition t o outpatient until she is safely able to drive.    Patient does not feel that she needs any further OT

## 2025-02-19 ENCOUNTER — HOME CARE VISIT (OUTPATIENT)
Dept: HOME HEALTH SERVICES | Facility: HOME HEALTH | Age: 60
End: 2025-02-19
Payer: COMMERCIAL

## 2025-02-19 PROCEDURE — G0152 HHCP-SERV OF OT,EA 15 MIN: HCPCS

## 2025-02-19 ASSESSMENT — ENCOUNTER SYMPTOMS
PAIN: 1
PAIN LOCATION - RELIEVING FACTORS: ICE PACK
PAIN LOCATION: RIGHT HIP
PAIN LOCATION - EXACERBATING FACTORS: ACTIVITY
LOWEST PAIN SEVERITY IN PAST 24 HOURS: 0/10
PAIN LOCATION - PAIN QUALITY: ACHE
PERSON REPORTING PAIN: PATIENT
PAIN LOCATION - PAIN SEVERITY: 2/10
HIGHEST PAIN SEVERITY IN PAST 24 HOURS: 4/10
PAIN SEVERITY GOAL: 0/10
SUBJECTIVE PAIN PROGRESSION: GRADUALLY IMPROVING
PAIN LOCATION - PAIN FREQUENCY: CONSTANT

## 2025-02-20 ENCOUNTER — HOME CARE VISIT (OUTPATIENT)
Dept: HOME HEALTH SERVICES | Facility: HOME HEALTH | Age: 60
End: 2025-02-20
Payer: COMMERCIAL

## 2025-02-20 VITALS
HEART RATE: 83 BPM | SYSTOLIC BLOOD PRESSURE: 128 MMHG | OXYGEN SATURATION: 97 % | DIASTOLIC BLOOD PRESSURE: 83 MMHG | TEMPERATURE: 97.4 F | RESPIRATION RATE: 18 BRPM

## 2025-02-20 PROCEDURE — G0151 HHCP-SERV OF PT,EA 15 MIN: HCPCS

## 2025-02-20 SDOH — HEALTH STABILITY: PHYSICAL HEALTH
EXERCISE COMMENTS: -LAQ    STANDING: X 10  -HEEL RAISES  -HAMSTRING CURL  -HIP ABDUCTION  -HIP EXTENSION  -MINI SQUATS  -MARCHES

## 2025-02-20 ASSESSMENT — ENCOUNTER SYMPTOMS
PAIN LOCATION: RIGHT HIP
PAIN LOCATION - PAIN SEVERITY: 0/10
PAIN: 1
PERSON REPORTING PAIN: PATIENT

## 2025-02-20 ASSESSMENT — ACTIVITIES OF DAILY LIVING (ADL): AMBULATION ASSISTANCE ON FLAT SURFACES: 1

## 2025-02-25 ENCOUNTER — HOME CARE VISIT (OUTPATIENT)
Dept: HOME HEALTH SERVICES | Facility: HOME HEALTH | Age: 60
End: 2025-02-25
Payer: COMMERCIAL

## 2025-02-25 VITALS
TEMPERATURE: 98.4 F | RESPIRATION RATE: 12 BRPM | SYSTOLIC BLOOD PRESSURE: 110 MMHG | OXYGEN SATURATION: 96 % | HEART RATE: 76 BPM | DIASTOLIC BLOOD PRESSURE: 68 MMHG

## 2025-02-25 PROCEDURE — G0157 HHC PT ASSISTANT EA 15: HCPCS | Mod: CQ

## 2025-02-25 ASSESSMENT — ENCOUNTER SYMPTOMS
PAIN LOCATION - PAIN FREQUENCY: FREQUENT
PAIN LOCATION - PAIN DURATION: SINCE SURGERY
PAIN LOCATION: RIGHT HIP
PAIN LOCATION - PAIN SEVERITY: 2/10
HIGHEST PAIN SEVERITY IN PAST 24 HOURS: 4/10
LOWEST PAIN SEVERITY IN PAST 24 HOURS: 1/10
PAIN LOCATION - RELIEVING FACTORS: MEDICATION, REST, ICE
PERSON REPORTING PAIN: PATIENT
PAIN LOCATION - EXACERBATING FACTORS: ACTIVITY, TIME OF DAY
PAIN SEVERITY GOAL: 0/10
PAIN: 1

## 2025-02-27 ENCOUNTER — HOME CARE VISIT (OUTPATIENT)
Dept: HOME HEALTH SERVICES | Facility: HOME HEALTH | Age: 60
End: 2025-02-27
Payer: COMMERCIAL

## 2025-02-27 VITALS
OXYGEN SATURATION: 99 % | SYSTOLIC BLOOD PRESSURE: 148 MMHG | TEMPERATURE: 97.5 F | HEART RATE: 92 BPM | DIASTOLIC BLOOD PRESSURE: 93 MMHG | RESPIRATION RATE: 18 BRPM

## 2025-02-27 PROCEDURE — G0151 HHCP-SERV OF PT,EA 15 MIN: HCPCS

## 2025-02-27 SDOH — HEALTH STABILITY: PHYSICAL HEALTH: EXERCISE COMMENTS: STANDING: X 10  -HEEL RAISES  -HAMSTRING CURL  -HIP ABDUCTION  -MINI SQUATS  -MARCHES

## 2025-02-27 ASSESSMENT — ENCOUNTER SYMPTOMS
PAIN LOCATION: RIGHT HIP
PAIN LOCATION - PAIN SEVERITY: 1/10
PERSON REPORTING PAIN: PATIENT

## 2025-02-27 ASSESSMENT — ACTIVITIES OF DAILY LIVING (ADL)
HOME_HEALTH_OASIS: 00
AMBULATION ASSISTANCE ON FLAT SURFACES: 1
OASIS_M1830: 00

## 2025-03-04 ENCOUNTER — EVALUATION (OUTPATIENT)
Dept: PHYSICAL THERAPY | Facility: HOSPITAL | Age: 60
End: 2025-03-04
Payer: COMMERCIAL

## 2025-03-04 DIAGNOSIS — M16.11 UNILATERAL PRIMARY OSTEOARTHRITIS, RIGHT HIP: ICD-10-CM

## 2025-03-04 PROCEDURE — 97161 PT EVAL LOW COMPLEX 20 MIN: CPT | Mod: GP | Performed by: PHYSICAL THERAPIST

## 2025-03-04 PROCEDURE — 97110 THERAPEUTIC EXERCISES: CPT | Mod: GP | Performed by: PHYSICAL THERAPIST

## 2025-03-04 SDOH — ECONOMIC STABILITY: FOOD INSECURITY: WITHIN THE PAST 12 MONTHS, YOU WORRIED THAT YOUR FOOD WOULD RUN OUT BEFORE YOU GOT MONEY TO BUY MORE.: NEVER TRUE

## 2025-03-04 SDOH — ECONOMIC STABILITY: FOOD INSECURITY: WITHIN THE PAST 12 MONTHS, THE FOOD YOU BOUGHT JUST DIDN'T LAST AND YOU DIDN'T HAVE MONEY TO GET MORE.: NEVER TRUE

## 2025-03-04 ASSESSMENT — ENCOUNTER SYMPTOMS
LOSS OF SENSATION IN FEET: 0
OCCASIONAL FEELINGS OF UNSTEADINESS: 1
DEPRESSION: 0

## 2025-03-04 ASSESSMENT — PAIN - FUNCTIONAL ASSESSMENT: PAIN_FUNCTIONAL_ASSESSMENT: 0-10

## 2025-03-04 ASSESSMENT — PATIENT HEALTH QUESTIONNAIRE - PHQ9
SUM OF ALL RESPONSES TO PHQ9 QUESTIONS 1 AND 2: 0
2. FEELING DOWN, DEPRESSED OR HOPELESS: NOT AT ALL
1. LITTLE INTEREST OR PLEASURE IN DOING THINGS: NOT AT ALL

## 2025-03-04 ASSESSMENT — COLUMBIA-SUICIDE SEVERITY RATING SCALE - C-SSRS
2. HAVE YOU ACTUALLY HAD ANY THOUGHTS OF KILLING YOURSELF?: NO
1. IN THE PAST MONTH, HAVE YOU WISHED YOU WERE DEAD OR WISHED YOU COULD GO TO SLEEP AND NOT WAKE UP?: NO
6. HAVE YOU EVER DONE ANYTHING, STARTED TO DO ANYTHING, OR PREPARED TO DO ANYTHING TO END YOUR LIFE?: NO

## 2025-03-04 ASSESSMENT — PAIN SCALES - GENERAL: PAINLEVEL_OUTOF10: 0 - NO PAIN

## 2025-03-04 NOTE — PROGRESS NOTES
Physical Therapy  Physical Therapy Orthopedic Evaluation    Patient Name: Gabriela Sanford  MRN: [id number]  Encounter Date: 3/4/2025  Time Calculation  Start Time: 1245  Stop Time: 1330  Time Calculation (min): 45 min    Today's Charges  PT Evaluation Time Entry  PT Evaluation (Low) Time Entry: 15  PT Therapeutic Procedures Time Entry  Therapeutic Exercise Time Entry: 25  Gait Training Time Entry: 5     Insurance:  Visit number: 1 of 12  Authorization info: no auth. 20 visits/year  Payor: MEDICAL [facility] / Plan: [facility] HMO / Product Type: *No Product type* /     Current Problem  Problem List Items Addressed This Visit    None  Visit Diagnoses         Codes    Unilateral primary osteoarthritis, right hip     M16.11    Relevant Orders    Follow Up In Physical Therapy          1. Unilateral primary osteoarthritis, right hip  Referral to Physical Therapy    Follow Up In Physical Therapy          General:  General  Reason for Referral: s/p R SHIVA  Referred By: Chris Cavazos  Past Medical History Relevant to Rehab: stenosis      Precautions: Hip precautions       Medical History Form: Reviewed (scanned into chart)    Subjective:   Subjective   Chief Complaint: Patient presents to clinic hip pain post operative  Onset Date: 2/3/25  JADEN: surgical    Current Condition:       Pain:  Pain Assessment: 0-10  0-10 (Numeric) Pain Score: 0 - No pain  Pain Type: Surgical pain  Pain Orientation: Right  Pain Radiating Towards: hip  Highest: 6/10 pain  Lowest: 0/10 pain  Location: R posterior hip  Description: swelling tight and discomfort  Aggravating Factors:  Squatting, Stairs, Running, and Jumping  Relieving Factors:  Ice    Relevant Information (PMH & Previous Tests/Imaging): pt has a past medical history of stenosis  Previous Interventions/Treatments: Physical Therapy    Prior Level of Function (PLOF)  Patient previously independent with all ADLs  Exercise/Physical Activity: gardening,  [employer]  Work/School: [employer]      Patients Living Environment: Reviewed and no concern    Primary Language: English    Patient's Goal(s) for Therapy: strengthening    Red Flags: Do you have any of the following? No  Fever/chills, unexplained weight changes, dizziness/fainting, unexplained change in bowel or bladder functions, unexplained malaise or muscle weakness, night pain/sweats, numbness or tingling    Objective:  Objective       ROM    Hip AROM (Degrees)      (R)  (L)  Flexion: 55  85   Extension: nil  10 deg.    Abduction: unable  unable    Adduction: unable  unable    ER:  n   n   IR:  n   n       Strength Testing  Hip      (R)  (L)  Flexion: 3  4     Extension: 2+  3    Abduction: n  n    Adduction: n  n    ER:  n  n    IR:  n  n        Functional Screening    Posture: lateral shift    Lower Extremity Functional Movements  Transfers: Modified Independent  Gait: antalgic  Assistive Device: cane  Balance  Feet Together: wnl  Tandem: wnl  SLS: no  Timed Sit to Stand: 15 seconds    Palpation: tenderness on posterior incision    Joint Mobility: limitation      Special Tests  N/a      Outcome Measures:  LEFS: 67/80     EDUCATION:   Individual(s) Educated: patient  Education Provided: Home exercise program, plan of care, activity modifications, pain management, and injury pathology  Handout(s) Provided: Scanned into chart  Home Program: See below treatment  Risk and Benefits Discussed with Patient/Caregiver/Other: Yes   Patient/Caregiver Demonstrated Understanding: Yes   Plan of Care Discussed and Agreed Upon: Yes   Patient Response to Education: Patient/Caregiver verbalized understanding of information, Patient/Caregiver performed return demonstration of exercises/activities, and Patient/Caregiver asked appropriate questions    Assessment: Patient presents with signs and symptoms consistent with S/p R posterior hip replacement, resulting in limited participation in pain-free ADLs and inability to perform at their  prior level of function. Skilled PT warranted to address the above stated impairments, so the patient can perform FA's without increased pain or difficulty.        Screening  Frequency  Date Last Completed   Spiritual and Cultural Beliefs   Screening each visit or episode of care 3/4/2025   Falls Risk Screening every ambulatory visit 3/4/2025  1:56 PM   Pain Screening annually at primary care visit     Domestic Violence screening annually at primary care visit 3/4/2025   Depression Screening annually in the primary care setting 3/4/2025   Suicide Risk Screening annually in the primary care setting 3/4/2025   Nutrition and Food Insecurity   Screening at least annually at primary care visit  3/4/2025   Key Learner annually in the primary care setting 3/4/2025   Drug Screen  2/3/2025  7:42 PM   Alcohol Screen  2/3/2025  7:42 PM   Advance Directive        Clinical Presentation: Stable and/or uncomplicated characteristics    Complexity: low complexity    Plan:  Goals: Set and discussed today    Plan of care was developed with input and agreement by the patient    Treatment Performed:  Therapeutic Exercise  Therapeutic Exercise Performed: Yes  Therapeutic Exercise Activity 1: bike 5 min  Therapeutic Exercise Activity 2: lunge st on stair x20    Ambulation/Gait Training  Ambulation/Gait Training Performed: Yes  Ambulation/Gait Training 1  Surface 1: Level tile  Device 1: No device  Assistance 1: Close supervision  Comments/Distance (ft) 1: 20 ft laps x2    Plan:  Treatment/Interventions: Education/ Instruction, Gait training, Manual therapy, Neuromuscular re-education, Taping techniques, Therapeutic activities, Therapeutic exercises  PT Plan: Skilled PT  PT Frequency: 2 times per week  Duration: 6 weeks  Onset Date: 02/03/25  Certification Period Start Date: 03/04/25  Certification Period End Date: 04/15/25  Number of Treatments Authorized: 1/12----20 for the year  Rehab Potential: Excellent  Plan of Care Agreement:  Patient    Goals: Set and discussed today  Active       PT Problem       PT Goal 1: pt will display 90 degrees SLR        Start:  03/04/25    Expected End:  04/15/25            PT Goal 2: patient will display equalized strength bl LE at 4/5        Start:  03/04/25    Expected End:  04/15/25            PT Goal 3: patient will display ambulation without SPC and deviations       Start:  03/04/25    Expected End:  04/15/25            Patient Stated Goal 1: patient will return to leisure riding with motor trike with  wnl balance and mobility       Start:  03/04/25    Expected End:  04/15/25            Patient Stated Goal 2: patient will perform reciprical gait with stairs for 1 flight of stairs       Start:  03/04/25    Expected End:  04/15/25                Plan of care was developed with input and agreement by the patient    Cleo Jerome, PT

## 2025-03-12 ENCOUNTER — TREATMENT (OUTPATIENT)
Dept: PHYSICAL THERAPY | Facility: HOSPITAL | Age: 60
End: 2025-03-12
Payer: COMMERCIAL

## 2025-03-12 DIAGNOSIS — M16.11 UNILATERAL PRIMARY OSTEOARTHRITIS, RIGHT HIP: ICD-10-CM

## 2025-03-12 PROCEDURE — 97110 THERAPEUTIC EXERCISES: CPT | Mod: GP | Performed by: PHYSICAL THERAPIST

## 2025-03-12 ASSESSMENT — PAIN SCALES - GENERAL: PAINLEVEL_OUTOF10: 0 - NO PAIN

## 2025-03-12 ASSESSMENT — PAIN - FUNCTIONAL ASSESSMENT: PAIN_FUNCTIONAL_ASSESSMENT: 0-10

## 2025-03-12 NOTE — PROGRESS NOTES
Physical Therapy Treatment    Patient Name: Gabriela Sanford  MRN: [id number]  Today's Date: 3/12/2025  Payor: MEDICAL [facility] / Plan: MEDICAL [employer] [facility] HMO / Product Type: *No Product type* /   Time Calculation  Start Time: 1255  Stop Time: 1344  Time Calculation (min): 49 min        PT Therapeutic Procedures Time Entry  Therapeutic Exercise Time Entry: 49                  Current Problem  1. Unilateral primary osteoarthritis, right hip  Follow Up In Physical Therapy        Problem List Items Addressed This Visit    None  Visit Diagnoses         Codes    Unilateral primary osteoarthritis, right hip     M16.11            General  Reason for Referral: s/p R SHIVA  Referred By: Chris Cavazos  Past Medical History Relevant to Rehab: stenosis    Subjective   Current Condition:   Better  Patient reports stiffness noted but no pain to remark. It limits her activity tolerance    Performing HEP?: Yes    Precautions     Pain  Pain Assessment: 0-10  0-10 (Numeric) Pain Score: 0 - No pain    Objective       Treatments:    Therapeutic Exercise  Therapeutic Exercise Performed: Yes  Therapeutic Exercise Activity 1: bike 5 min  Therapeutic Exercise Activity 2: lunge st on stair x10 hold 10  Therapeutic Exercise Activity 3: gastroc st 3h30  Therapeutic Exercise Activity 4: 2x10 STS with RTb  Therapeutic Exercise Activity 5: hip abd in SL 3x20  Therapeutic Exercise Activity 6: x20 PPT with breathing  Therapeutic Exercise Activity 7: 3x10 PPT + hip ADD  Therapeutic Exercise Activity 8: SLR with PF with lift and df with loweing 10x                             EDUCATION:   Individual(s) Educated: Patient  Education Provided: verbal education  Handout(s) Provided: Scanned into chart  Home Program: continue as planned with   Risk and Benefits Discussed with Patient/Caregiver/Other: Yes   Patient/Caregiver Demonstrated Understanding: Yes   Patient Response to Education: Patient/Caregiver verbalized understanding of  information, Patient/Caregiver performed return demonstration of exercises/activities, and Patient/Caregiver asked appropriate questions    Assessment: pt benefited from session some limitation due to bl knee OA but R hip is displaying improved function        Plan: Continue with POC. To improve glute and quads  OP PT Plan  Treatment/Interventions: Education/ Instruction, Gait training, Manual therapy, Neuromuscular re-education, Taping techniques, Therapeutic activities, Therapeutic exercises  PT Plan: Skilled PT  PT Frequency: 2 times per week  Duration: 6 weeks  Onset Date: 02/03/25  Certification Period Start Date: 03/04/25  Certification Period End Date: 04/15/25  Number of Treatments Authorized: 1/12----20 for the year  Rehab Potential: Excellent  Plan of Care Agreement: Patient    Goals:  Active       PT Problem       PT Goal 1: pt will display 90 degrees SLR  (Progressing)       Start:  03/04/25    Expected End:  04/15/25            PT Goal 2: patient will display equalized strength bl LE at 4/5  (Progressing)       Start:  03/04/25    Expected End:  04/15/25            PT Goal 3: patient will display ambulation without SPC and deviations (Progressing)       Start:  03/04/25    Expected End:  04/15/25            Patient Stated Goal 1: patient will return to leisure riding with motor trike with  wnl balance and mobility (Progressing)       Start:  03/04/25    Expected End:  04/15/25            Patient Stated Goal 2: patient will perform reciprical gait with stairs for 1 flight of stairs (Progressing)       Start:  03/04/25    Expected End:  04/15/25                 Cleo Jerome, PT

## 2025-03-14 ENCOUNTER — APPOINTMENT (OUTPATIENT)
Dept: PHYSICAL THERAPY | Facility: HOSPITAL | Age: 60
End: 2025-03-14
Payer: COMMERCIAL

## 2025-03-18 ENCOUNTER — TREATMENT (OUTPATIENT)
Dept: PHYSICAL THERAPY | Facility: HOSPITAL | Age: 60
End: 2025-03-18
Payer: COMMERCIAL

## 2025-03-18 DIAGNOSIS — M16.11 UNILATERAL PRIMARY OSTEOARTHRITIS, RIGHT HIP: ICD-10-CM

## 2025-03-18 PROCEDURE — 97110 THERAPEUTIC EXERCISES: CPT | Mod: GP | Performed by: PHYSICAL THERAPIST

## 2025-03-18 ASSESSMENT — PAIN - FUNCTIONAL ASSESSMENT: PAIN_FUNCTIONAL_ASSESSMENT: 0-10

## 2025-03-18 ASSESSMENT — PAIN SCALES - GENERAL: PAINLEVEL_OUTOF10: 0 - NO PAIN

## 2025-03-18 NOTE — PROGRESS NOTES
Physical Therapy Treatment    Patient Name: Gabriela Sanford  MRN: [id number]  Today's Date: 3/18/2025  Payor: MEDICAL RA[employer]UL [facility] / Plan: MEDICAL [facility] HMO / Product Type: *No Product type* /   Time Calculation  Start Time: 1255  Stop Time: 1347  Time Calculation (min): 52 min        PT Therapeutic Procedures Time Entry  Therapeutic Exercise Time Entry: 52                  Current Problem  1. Unilateral primary osteoarthritis, right hip  Follow Up In Physical Therapy        Problem List Items Addressed This Visit    None  Visit Diagnoses         Codes    Unilateral primary osteoarthritis, right hip     M16.11            General  Reason for Referral: s/p R SHIVA  Referred By: Chris Cavazos  Past Medical History Relevant to Rehab: OA, stenosis    Subjective   Current Condition:   Better  Patient reports she had significant fatigue and soreness of last session was too intense.    Performing HEP?: Yes    Precautions     Pain  Pain Assessment: 0-10  0-10 (Numeric) Pain Score: 0 - No pain  Pain Orientation: Right  Pain Radiating Towards: hip    Objective     Treatments:    Therapeutic Exercise  Therapeutic Exercise Performed: Yes  Therapeutic Exercise Activity 1: bike 5 min  Therapeutic Exercise Activity 2: lunge st on stair x10 hold 10  Therapeutic Exercise Activity 3: gastroc st 3h30  Therapeutic Exercise Activity 4: [id number] hs st  Therapeutic Exercise Activity 5: SAQ x20  Therapeutic Exercise Activity 6: x20 glute bridge  Therapeutic Exercise Activity 7: x20 LAQ  Therapeutic Exercise Activity 8: SLR x20    EDUCATION:   Individual(s) Educated: Patient  Education Provided: education on DOMS as well  Handout(s) Provided: Scanned into chart  Home Program: continue as planned  Risk and Benefits Discussed with Patient/Caregiver/Other: Yes   Patient/Caregiver Demonstrated Understanding: Yes   Patient Response to Education: Patient/Caregiver verbalized understanding of information, Patient/Caregiver performed  return demonstration of exercises/activities, and Patient/Caregiver asked appropriate questions    Assessment: pt benefited from table routine to reduce L knee strain. Pt is to be progressed as able next session       Plan: Continue with POC.   OP PT Plan  Treatment/Interventions: Education/ Instruction, Gait training, Manual therapy, Neuromuscular re-education, Taping techniques, Therapeutic activities, Therapeutic exercises  PT Plan: Skilled PT  PT Frequency: 2 times per week  Duration: 6 weeks  Onset Date: 02/03/25  Certification Period Start Date: 03/04/25  Certification Period End Date: 04/15/25  Number of Treatments Authorized: 3/12----20 for the year  Rehab Potential: Excellent  Plan of Care Agreement: Patient    Goals:  Active       PT Problem       PT Goal 1: pt will display 90 degrees SLR  (Progressing)       Start:  03/04/25    Expected End:  04/15/25            PT Goal 2: patient will display equalized strength bl LE at 4/5  (Progressing)       Start:  03/04/25    Expected End:  04/15/25            PT Goal 3: patient will display ambulation without SPC and deviations (Progressing)       Start:  03/04/25    Expected End:  04/15/25            Patient Stated Goal 1: patient will return to leisure riding with motor trike with  wnl balance and mobility (Progressing)       Start:  03/04/25    Expected End:  04/15/25            Patient Stated Goal 2: patient will perform reciprical gait with stairs for 1 flight of stairs (Progressing)       Start:  03/04/25    Expected End:  04/15/25                 Cleo Jerome, PT

## 2025-03-20 ENCOUNTER — TREATMENT (OUTPATIENT)
Dept: PHYSICAL THERAPY | Facility: HOSPITAL | Age: 60
End: 2025-03-20
Payer: COMMERCIAL

## 2025-03-20 DIAGNOSIS — M16.11 UNILATERAL PRIMARY OSTEOARTHRITIS, RIGHT HIP: ICD-10-CM

## 2025-03-20 PROCEDURE — 97110 THERAPEUTIC EXERCISES: CPT | Mod: GP | Performed by: PHYSICAL THERAPIST

## 2025-03-20 ASSESSMENT — PAIN - FUNCTIONAL ASSESSMENT: PAIN_FUNCTIONAL_ASSESSMENT: 0-10

## 2025-03-20 ASSESSMENT — PAIN SCALES - GENERAL: PAINLEVEL_OUTOF10: 0 - NO PAIN

## 2025-03-20 NOTE — PROGRESS NOTES
Physical Therapy Treatment    Patient Name: Gabriela Sanford  MRN: [id number]  Today's Date: 3/20/2025  Payor: MEDICAL [employer] [facility] / Plan: MEDICAL [employer] [facility] HMO / Product Type: *No Product type* /   Time Calculation  Start Time: 1300  Stop Time: 1345  Time Calculation (min): 45 min        PT Therapeutic Procedures Time Entry  Therapeutic Exercise Time Entry: 45                  Current Problem  1. Unilateral primary osteoarthritis, right hip  Follow Up In Physical Therapy        Problem List Items Addressed This Visit    None  Visit Diagnoses         Codes    Unilateral primary osteoarthritis, right hip     M16.11            General  Reason for Referral: s/p R SHIVA  Referred By: Chris Esposito  Past Medical History Relevant to Rehab: OA, stenosis    Subjective   Current Condition:   Better  Patient reports she is reprieved from her hip precautions and wants to work on mobility    Performing HEP?: Yes    Precautions  Precautions  LE Weight Bearing Status: Weight Bearing as Tolerated  Medical Precautions: No known precautions/limitation  Precautions Comment: removed hip precautions  Pain  Pain Assessment: 0-10  0-10 (Numeric) Pain Score: 0 - No pain  Pain Type: Surgical pain  Pain Orientation: Right    Objective       Treatments:    Therapeutic Exercise  Therapeutic Exercise Performed: Yes  Therapeutic Exercise Activity 1: bike 5 min  Therapeutic Exercise Activity 2: lunge st on stair x10 hold 10  Therapeutic Exercise Activity 3: gastroc st 3h30  Therapeutic Exercise Activity 4: clamshell x20  Therapeutic Exercise Activity 5: SAQ x20  Therapeutic Exercise Activity 6: rev. clamshell x20  Therapeutic Exercise Activity 7: x20 LAQ  Therapeutic Exercise Activity 8: bridging x20      EDUCATION:   Individual(s) Educated: Patient  Education Provided: education on mobility  Handout(s) Provided: Scanned into chart  Home Program: continue plan  Risk and Benefits Discussed with Patient/Caregiver/Other: Yes    Patient/Caregiver Demonstrated Understanding: Yes   Patient Response to Education: Patient/Caregiver verbalized understanding of information, Patient/Caregiver performed return demonstration of exercises/activities, and Patient/Caregiver asked appropriate questions    Assessment: pt benefited from the session. dis       Plan: Continue with POC.   OP PT Plan  Treatment/Interventions: Education/ Instruction, Gait training, Manual therapy, Neuromuscular re-education, Taping techniques, Therapeutic activities, Therapeutic exercises  PT Plan: Skilled PT  PT Frequency: 2 times per week  Duration: 6 weeks  Onset Date: 02/03/25  Certification Period Start Date: 03/04/25  Certification Period End Date: 04/15/25  Number of Treatments Authorized: 4/12----20 for the year  Rehab Potential: Excellent  Plan of Care Agreement: Patient    Goals:  Active       PT Problem       PT Goal 1: pt will display 90 degrees SLR  (Progressing)       Start:  03/04/25    Expected End:  04/15/25            PT Goal 2: patient will display equalized strength bl LE at 4/5  (Progressing)       Start:  03/04/25    Expected End:  04/15/25            PT Goal 3: patient will display ambulation without SPC and deviations (Progressing)       Start:  03/04/25    Expected End:  04/15/25            Patient Stated Goal 1: patient will return to leisure riding with motor trike with  wnl balance and mobility (Progressing)       Start:  03/04/25    Expected End:  04/15/25            Patient Stated Goal 2: patient will perform reciprical gait with stairs for 1 flight of stairs (Progressing)       Start:  03/04/25    Expected End:  04/15/25                 Cleo Jerome, PT

## 2025-03-21 ENCOUNTER — APPOINTMENT (OUTPATIENT)
Facility: CLINIC | Age: 60
End: 2025-03-21
Payer: COMMERCIAL

## 2025-03-25 ENCOUNTER — TREATMENT (OUTPATIENT)
Dept: PHYSICAL THERAPY | Facility: HOSPITAL | Age: 60
End: 2025-03-25
Payer: COMMERCIAL

## 2025-03-25 DIAGNOSIS — M16.11 UNILATERAL PRIMARY OSTEOARTHRITIS, RIGHT HIP: ICD-10-CM

## 2025-03-25 PROCEDURE — 97110 THERAPEUTIC EXERCISES: CPT | Mod: GP | Performed by: PHYSICAL THERAPIST

## 2025-03-25 ASSESSMENT — PAIN SCALES - GENERAL: PAINLEVEL_OUTOF10: 0 - NO PAIN

## 2025-03-25 ASSESSMENT — PAIN - FUNCTIONAL ASSESSMENT: PAIN_FUNCTIONAL_ASSESSMENT: 0-10

## 2025-03-25 NOTE — PROGRESS NOTES
Physical Therapy Treatment    Patient Name: Gabriela Sanford  MRN: [id number]  Today's Date: 3/25/2025  Payor: [employer]ICAL [employer] [facility] / Plan: MEDICAL [facility] HMO / Product Type: *No Product type* /   Time Calculation  Start Time: 1115  Stop Time: 1202  Time Calculation (min): 47 min        PT Therapeutic Procedures Time Entry  Therapeutic Exercise Time Entry: 47                  Current Problem  1. Unilateral primary osteoarthritis, right hip  Follow Up In Physical Therapy        Problem List Items Addressed This Visit    None  Visit Diagnoses         Codes    Unilateral primary osteoarthritis, right hip     M16.11            General  Reason for Referral: s/p R SHIVA  Referred By: Chris Esposito  Past Medical History Relevant to Rehab: OA, stenosis    Subjective   Current Condition:   Better  Patient reports pt reports feeling really tight today and wanting to focus on mobility    Performing HEP?: Yes    Precautions  Precautions  LE Weight Bearing Status: Weight Bearing as Tolerated  Medical Precautions: No known precautions/limitation  Precautions Comment: removed hip precautions  Pain  Pain Assessment: 0-10  0-10 (Numeric) Pain Score: 0 - No pain  Pain Type: Surgical pain    Objective     Treatments:    Therapeutic Exercise  Therapeutic Exercise Performed: Yes  Therapeutic Exercise Activity 1: seated elliptical  Therapeutic Exercise Activity 2: lunge st on stair x10 hold 10  Therapeutic Exercise Activity 3: SKTC 20  Therapeutic Exercise Activity 4: hs st 3h30 supine  Therapeutic Exercise Activity 5: LTR x20  Therapeutic Exercise Activity 6: butterfly x20                      EDUCATION:   Individual(s) Educated: Patient  Education Provided: Access Code: [email or web address]  URL: [email or web address]  Date: 03/25/2025  Prepared by: Cleo Jerome    Exercises  - Supine Hamstring Stretch with Strap  - 1 x daily - 7 x weekly - 3 sets - 10 reps  - Supine Lower Trunk Rotation  - 1 x daily - 7 x  weekly - 3 sets - 10 reps  - Supine Single Knee to Double Knee to Chest Stretch  - 1 x daily - 7 x weekly - 3 sets - 10 reps  - Supine Butterfly Groin Stretch  - 1 x daily - 7 x weekly - 10 reps - 10 hold  - Child's Pose Knees Apart and Hands Forward   - 1 x daily - 7 x weekly - 10 reps - 10 hold  Handout(s) Provided: Scanned into chart  Home Program: above  Risk and Benefits Discussed with Patient/Caregiver/Other: Yes   Patient/Caregiver Demonstrated Understanding: Yes   Patient Response to Education: Patient/Caregiver verbalized understanding of information, Patient/Caregiver performed return demonstration of exercises/activities, and Patient/Caregiver asked appropriate questions    Assessment:  pt displayed improved mobility throughout session displaying reduced restriction.       Plan: Continue with POC.   OP PT Plan  Treatment/Interventions: Education/ Instruction, Gait training, Manual therapy, Neuromuscular re-education, Taping techniques, Therapeutic activities, Therapeutic exercises  PT Plan: Skilled PT  PT Frequency: 2 times per week  Duration: 6 weeks  Onset Date: 02/03/25  Certification Period Start Date: 03/04/25  Certification Period End Date: 04/15/25  Number of Treatments Authorized: 5/12----20 for the year  Rehab Potential: Excellent  Plan of Care Agreement: Patient    Goals:  Active       PT Problem       PT Goal 1: pt will display 90 degrees SLR  (Progressing)       Start:  03/04/25    Expected End:  04/15/25            PT Goal 2: patient will display equalized strength bl LE at 4/5  (Progressing)       Start:  03/04/25    Expected End:  04/15/25            PT Goal 3: patient will display ambulation without SPC and deviations (Progressing)       Start:  03/04/25    Expected End:  04/15/25            Patient Stated Goal 1: patient will return to leisure riding with motor trike with  wnl balance and mobility (Progressing)       Start:  03/04/25    Expected End:  04/15/25            Patient  Stated Goal 2: patient will perform reciprical gait with stairs for 1 flight of stairs (Progressing)       Start:  03/04/25    Expected End:  04/15/25                 Cleo Jerome, PT

## 2025-03-27 ENCOUNTER — TREATMENT (OUTPATIENT)
Dept: PHYSICAL THERAPY | Facility: HOSPITAL | Age: 60
End: 2025-03-27
Payer: COMMERCIAL

## 2025-03-27 DIAGNOSIS — M16.11 UNILATERAL PRIMARY OSTEOARTHRITIS, RIGHT HIP: ICD-10-CM

## 2025-03-27 PROCEDURE — 97110 THERAPEUTIC EXERCISES: CPT | Mod: GP | Performed by: PHYSICAL THERAPIST

## 2025-03-27 ASSESSMENT — PAIN - FUNCTIONAL ASSESSMENT: PAIN_FUNCTIONAL_ASSESSMENT: 0-10

## 2025-03-27 ASSESSMENT — PAIN SCALES - GENERAL: PAINLEVEL_OUTOF10: 0 - NO PAIN

## 2025-03-27 NOTE — PROGRESS NOTES
Physical Therapy Treatment    Patient Name: Gabriela Sanford  MRN: [id number]  Today's Date: 3/27/2025  Payor: MEDICAL RAHUL [facility] / Plan: MEDICAL RAHUL [facility] HMO / Product Type: *No Product type* /   Time Calculation  Start Time: 1115  Stop Time: 1200  Time Calculation (min): 45 min        PT Therapeutic Procedures Time Entry  Therapeutic Exercise Time Entry: 45                  Current Problem  1. Unilateral primary osteoarthritis, right hip  Follow Up In Physical Therapy        Problem List Items Addressed This Visit    None  Visit Diagnoses         Codes    Unilateral primary osteoarthritis, right hip     M16.11            General  Reason for Referral: s/p R SHIVA  Referred By: Chris Esposito  Past Medical History Relevant to Rehab: OA, stenosis    Subjective   Current Condition:   Better  Patient reports pt reports improved movement    Performing HEP?: Yes    Precautions  Precautions  LE Weight Bearing Status: Weight Bearing as Tolerated  Medical Precautions: No known precautions/limitation  Precautions Comment: removed hip precautions  Pain  Pain Assessment: 0-10  0-10 (Numeric) Pain Score: 0 - No pain  Pain Type: Surgical pain    Objective       Treatments:    Therapeutic Exercise  Therapeutic Exercise Performed: Yes  Therapeutic Exercise Activity 1: seated elliptical  Therapeutic Exercise Activity 2: klein hip ext and knee flexion x30  Therapeutic Exercise Activity 3: LTR x30  Therapeutic Exercise Activity 4: modified happy baby 3 min  Therapeutic Exercise Activity 5: banded bridge with ER 3x10  Therapeutic Exercise Activity 6: hip hinge x20 4#  Therapeutic Exercise Activity 7: gastroc st 3 min       EDUCATION:   Individual(s) Educated: Patient  Education Provided: education on posture and ROM  Handout(s) Provided: Scanned into chart  Home Program: continue  Risk and Benefits Discussed with Patient/Caregiver/Other: Yes   Patient/Caregiver Demonstrated Understanding: Yes   Patient Response to  Education: Patient/Caregiver verbalized understanding of information, Patient/Caregiver performed return demonstration of exercises/activities, and Patient/Caregiver asked appropriate questions    Assessment: pt benefited from session displaying increased loading of posterior       Plan: Continue with POC.   OP PT Plan  Treatment/Interventions: Education/ Instruction, Gait training, Manual therapy, Neuromuscular re-education, Taping techniques, Therapeutic activities, Therapeutic exercises  PT Plan: Skilled PT  PT Frequency: 2 times per week  Duration: 6 weeks  Onset Date: 02/03/25  Certification Period Start Date: 03/04/25  Certification Period End Date: 04/15/25  Number of Treatments Authorized: 6/12----20 for the year  Rehab Potential: Excellent  Plan of Care Agreement: Patient    Goals:  Active       PT Problem       PT Goal 1: pt will display 90 degrees SLR  (Progressing)       Start:  03/04/25    Expected End:  04/15/25            PT Goal 2: patient will display equalized strength bl LE at 4/5  (Progressing)       Start:  03/04/25    Expected End:  04/15/25            PT Goal 3: patient will display ambulation without SPC and deviations (Progressing)       Start:  03/04/25    Expected End:  04/15/25            Patient Stated Goal 1: patient will return to leisure riding with motor trike with  wnl balance and mobility (Progressing)       Start:  03/04/25    Expected End:  04/15/25            Patient Stated Goal 2: patient will perform reciprical gait with stairs for 1 flight of stairs (Progressing)       Start:  03/04/25    Expected End:  04/15/25                 Cleo Jerome, PT

## 2025-04-03 ENCOUNTER — APPOINTMENT (OUTPATIENT)
Dept: PHYSICAL THERAPY | Facility: HOSPITAL | Age: 60
End: 2025-04-03
Payer: COMMERCIAL

## 2025-04-07 ENCOUNTER — TREATMENT (OUTPATIENT)
Dept: PHYSICAL THERAPY | Facility: HOSPITAL | Age: 60
End: 2025-04-07
Payer: COMMERCIAL

## 2025-04-07 DIAGNOSIS — M16.11 UNILATERAL PRIMARY OSTEOARTHRITIS, RIGHT HIP: ICD-10-CM

## 2025-04-07 PROCEDURE — 97110 THERAPEUTIC EXERCISES: CPT | Mod: GP

## 2025-04-07 ASSESSMENT — PAIN SCALES - GENERAL: PAINLEVEL_OUTOF10: 0 - NO PAIN

## 2025-04-07 NOTE — PROGRESS NOTES
Physical Therapy Treatment    Patient Name: Gabriela Sanford  MRN: [id number]  Today's Date: 4/7/2025  Payor: MEDICAL RAHUL [facility] / Plan: MEDICAL RAHUL [facility] HMO / Product Type: *No Product type* /   Time Calculation  Start Time: 1345  Stop Time: 1427  Time Calculation (min): 42 min      PT Therapeutic Procedures Time Entry  Therapeutic Exercise Time Entry: 42     Current Problem  1. Unilateral primary osteoarthritis, right hip  Follow Up In Physical Therapy        Problem List Items Addressed This Visit    None  Visit Diagnoses         Codes    Unilateral primary osteoarthritis, right hip     M16.11          General  Reason for Referral: s/p R SHIVA  Referred By: Chris Esposito  Past Medical History Relevant to Rehab: OA, stenosis    Subjective   Current Condition:   Better  Patient reports hip is doing well but she feels a little under the weather 2/2 abdominal     Performing HEP?: Yes    Precautions  Precautions  LE Weight Bearing Status: Weight Bearing as Tolerated  Medical Precautions: No known precautions/limitation  Pain  0-10 (Numeric) Pain Score: 0 - No pain    Objective     Treatments:  Therapeutic Exercise  Therapeutic Exercise Performed: Yes  Therapeutic Exercise Activity 1: Physiostep x10  Therapeutic Exercise Activity 2: klein hip extension 3x10  Therapeutic Exercise Activity 3: klein back extension 3x10  Therapeutic Exercise Activity 4: bridge 3x10  Therapeutic Exercise Activity 5: SL abduction 3x10  Therapeutic Exercise Activity 6: R CC abduction (L hip hikes) 3x10  Therapeutic Exercise Activity 7: lateral step ups 3x10     EDUCATION:   Individual(s) Educated: Patient  Education Provided: Discussed role of hip abductors in stable gait.  Handout(s) Provided: Scanned into chart  Home Program: Yes  Risk and Benefits Discussed with Patient/Caregiver/Other: Yes   Patient/Caregiver Demonstrated Understanding: Yes   Patient Response to Education: Patient/Caregiver verbalized understanding  of information    Assessment: Progressing well toward mobility goals.     Plan: Continue with POC.     OP PT Plan  Treatment/Interventions: Education/ Instruction, Gait training, Manual therapy, Neuromuscular re-education, Taping techniques, Therapeutic activities, Therapeutic exercises  PT Plan: Skilled PT  PT Frequency: 2 times per week  Duration: 6 weeks  Onset Date: 02/03/25  Certification Period Start Date: 03/04/25  Certification Period End Date: 04/15/25  Number of Treatments Authorized: 7/12----20 for the year  Rehab Potential: Excellent  Plan of Care Agreement: Patient    Goals:  Active       PT Problem       PT Goal 1: pt will display 90 degrees SLR  (Progressing)       Start:  03/04/25    Expected End:  04/15/25            PT Goal 2: patient will display equalized strength bl LE at 4/5  (Progressing)       Start:  03/04/25    Expected End:  04/15/25            PT Goal 3: patient will display ambulation without SPC and deviations (Progressing)       Start:  03/04/25    Expected End:  04/15/25            Patient Stated Goal 1: patient will return to leisure riding with motor trike with  wnl balance and mobility (Progressing)       Start:  03/04/25    Expected End:  04/15/25            Patient Stated Goal 2: patient will perform reciprical gait with stairs for 1 flight of stairs (Progressing)       Start:  03/04/25    Expected End:  04/15/25                 Melvin Patel, PT

## 2025-04-10 ENCOUNTER — DOCUMENTATION (OUTPATIENT)
Dept: PHYSICAL THERAPY | Facility: HOSPITAL | Age: 60
End: 2025-04-10
Payer: COMMERCIAL

## 2025-04-10 ENCOUNTER — APPOINTMENT (OUTPATIENT)
Dept: PHYSICAL THERAPY | Facility: HOSPITAL | Age: 60
End: 2025-04-10
Payer: COMMERCIAL

## 2025-04-10 NOTE — PROGRESS NOTES
Physical Therapy                 Therapy Communication Note    Patient Name: Gabriela Sanford  MRN: [id number]  Department:   Room: Room/bed info not found  Today's Date: 4/10/2025     Discipline: Physical Therapy    Missed Time: Cancel    Comment: Pt cancelling her anastasiya stating she has a migraine. Next anastasiya for 4/15/2025.

## 2025-04-11 ENCOUNTER — APPOINTMENT (OUTPATIENT)
Dept: RADIOLOGY | Facility: HOSPITAL | Age: 60
End: 2025-04-11
Payer: COMMERCIAL

## 2025-04-11 ENCOUNTER — APPOINTMENT (OUTPATIENT)
Dept: CARDIOLOGY | Facility: HOSPITAL | Age: 60
End: 2025-04-11
Payer: COMMERCIAL

## 2025-04-11 ENCOUNTER — HOSPITAL ENCOUNTER (EMERGENCY)
Facility: HOSPITAL | Age: 60
Discharge: OTHER INSTITUTION | End: 2025-04-11
Attending: FAMILY MEDICINE
Payer: COMMERCIAL

## 2025-04-11 VITALS
HEIGHT: 67 IN | WEIGHT: 220 LBS | TEMPERATURE: 98.1 F | BODY MASS INDEX: 34.53 KG/M2 | OXYGEN SATURATION: 97 % | RESPIRATION RATE: 14 BRPM | SYSTOLIC BLOOD PRESSURE: 100 MMHG | DIASTOLIC BLOOD PRESSURE: 55 MMHG | HEART RATE: 64 BPM

## 2025-04-11 DIAGNOSIS — R07.9 CHEST PAIN, UNSPECIFIED TYPE: ICD-10-CM

## 2025-04-11 DIAGNOSIS — E04.2 MULTIPLE THYROID NODULES: Primary | ICD-10-CM

## 2025-04-11 DIAGNOSIS — R12 HEARTBURN: ICD-10-CM

## 2025-04-11 LAB
ALBUMIN SERPL BCP-MCNC: 4.6 G/DL (ref 3.4–5)
ALP SERPL-CCNC: 97 U/L (ref 33–136)
ALT SERPL W P-5'-P-CCNC: 17 U/L (ref 7–45)
ANION GAP SERPL CALC-SCNC: 11 MMOL/L (ref 10–20)
APPEARANCE UR: CLEAR
APTT PPP: 34 SECONDS (ref 26–36)
AST SERPL W P-5'-P-CCNC: 19 U/L (ref 9–39)
ATRIAL RATE: 57 BPM
ATRIAL RATE: 81 BPM
BASOPHILS # BLD AUTO: 0.06 X10*3/UL (ref 0–0.1)
BASOPHILS NFR BLD AUTO: 0.7 %
BILIRUB SERPL-MCNC: 0.4 MG/DL (ref 0–1.2)
BILIRUB UR STRIP.AUTO-MCNC: NEGATIVE MG/DL
BNP SERPL-MCNC: 16 PG/ML (ref 0–99)
BUN SERPL-MCNC: 23 MG/DL (ref 6–23)
CALCIUM SERPL-MCNC: 9.3 MG/DL (ref 8.6–10.3)
CARDIAC TROPONIN I PNL SERPL HS: 4 NG/L (ref 0–13)
CARDIAC TROPONIN I PNL SERPL HS: 4 NG/L (ref 0–13)
CHLORIDE SERPL-SCNC: 103 MMOL/L (ref 98–107)
CO2 SERPL-SCNC: 28 MMOL/L (ref 21–32)
COLOR UR: YELLOW
CREAT SERPL-MCNC: 0.68 MG/DL (ref 0.5–1.05)
EGFRCR SERPLBLD CKD-EPI 2021: >90 ML/MIN/1.73M*2
EOSINOPHIL # BLD AUTO: 0.26 X10*3/UL (ref 0–0.7)
EOSINOPHIL NFR BLD AUTO: 3.1 %
ERYTHROCYTE [DISTWIDTH] IN BLOOD BY AUTOMATED COUNT: 12.5 % (ref 11.5–14.5)
GLUCOSE SERPL-MCNC: 108 MG/DL (ref 74–99)
GLUCOSE UR STRIP.AUTO-MCNC: NEGATIVE MG/DL
HCT VFR BLD AUTO: 42.3 % (ref 36–46)
HGB BLD-MCNC: 13.5 G/DL (ref 12–16)
HOLD SPECIMEN: NORMAL
HOLD SPECIMEN: NORMAL
IMM GRANULOCYTES # BLD AUTO: 0.03 X10*3/UL (ref 0–0.7)
IMM GRANULOCYTES NFR BLD AUTO: 0.4 % (ref 0–0.9)
INR PPP: 0.9 (ref 0.9–1.1)
KETONES UR STRIP.AUTO-MCNC: NEGATIVE MG/DL
LEUKOCYTE ESTERASE UR QL STRIP.AUTO: ABNORMAL
LYMPHOCYTES # BLD AUTO: 1.93 X10*3/UL (ref 1.2–4.8)
LYMPHOCYTES NFR BLD AUTO: 23.1 %
MCH RBC QN AUTO: 29.3 PG (ref 26–34)
MCHC RBC AUTO-ENTMCNC: 31.9 G/DL (ref 32–36)
MCV RBC AUTO: 92 FL (ref 80–100)
MONOCYTES # BLD AUTO: 0.61 X10*3/UL (ref 0.1–1)
MONOCYTES NFR BLD AUTO: 7.3 %
NEUTROPHILS # BLD AUTO: 5.45 X10*3/UL (ref 1.2–7.7)
NEUTROPHILS NFR BLD AUTO: 65.4 %
NITRITE UR QL STRIP.AUTO: NEGATIVE
NRBC BLD-RTO: 0 /100 WBCS (ref 0–0)
P AXIS: 45 DEGREES
P AXIS: 67 DEGREES
P OFFSET: 199 MS
P OFFSET: 204 MS
P ONSET: 139 MS
P ONSET: 141 MS
PH UR STRIP.AUTO: 6 [PH]
PLATELET # BLD AUTO: 385 X10*3/UL (ref 150–450)
POTASSIUM SERPL-SCNC: 3.6 MMOL/L (ref 3.5–5.3)
PR INTERVAL: 156 MS
PR INTERVAL: 160 MS
PROT SERPL-MCNC: 7.5 G/DL (ref 6.4–8.2)
PROT UR STRIP.AUTO-MCNC: NEGATIVE MG/DL
PROTHROMBIN TIME: 10.4 SECONDS (ref 9.8–12.4)
Q ONSET: 219 MS
Q ONSET: 219 MS
QRS COUNT: 10 BEATS
QRS COUNT: 14 BEATS
QRS DURATION: 76 MS
QRS DURATION: 82 MS
QT INTERVAL: 380 MS
QT INTERVAL: 434 MS
QTC CALCULATION(BAZETT): 422 MS
QTC CALCULATION(BAZETT): 441 MS
QTC FREDERICIA: 419 MS
QTC FREDERICIA: 426 MS
R AXIS: 26 DEGREES
R AXIS: 42 DEGREES
RBC # BLD AUTO: 4.6 X10*6/UL (ref 4–5.2)
RBC # UR STRIP.AUTO: ABNORMAL MG/DL
RBC #/AREA URNS AUTO: NORMAL /HPF
SODIUM SERPL-SCNC: 138 MMOL/L (ref 136–145)
SP GR UR STRIP.AUTO: 1.01
SQUAMOUS #/AREA URNS AUTO: NORMAL /HPF
T AXIS: 35 DEGREES
T AXIS: 36 DEGREES
T OFFSET: 409 MS
T OFFSET: 436 MS
UROBILINOGEN UR STRIP.AUTO-MCNC: <2 MG/DL
VENTRICULAR RATE: 57 BPM
VENTRICULAR RATE: 81 BPM
WBC # BLD AUTO: 8.3 X10*3/UL (ref 4.4–11.3)
WBC #/AREA URNS AUTO: NORMAL /HPF

## 2025-04-11 PROCEDURE — 87086 URINE CULTURE/COLONY COUNT: CPT | Mod: CONLAB | Performed by: FAMILY MEDICINE

## 2025-04-11 PROCEDURE — 36415 COLL VENOUS BLD VENIPUNCTURE: CPT | Performed by: FAMILY MEDICINE

## 2025-04-11 PROCEDURE — 96360 HYDRATION IV INFUSION INIT: CPT | Mod: 59

## 2025-04-11 PROCEDURE — 84484 ASSAY OF TROPONIN QUANT: CPT | Performed by: FAMILY MEDICINE

## 2025-04-11 PROCEDURE — 93005 ELECTROCARDIOGRAM TRACING: CPT

## 2025-04-11 PROCEDURE — 2500000001 HC RX 250 WO HCPCS SELF ADMINISTERED DRUGS (ALT 637 FOR MEDICARE OP)

## 2025-04-11 PROCEDURE — 99285 EMERGENCY DEPT VISIT HI MDM: CPT | Mod: 25 | Performed by: FAMILY MEDICINE

## 2025-04-11 PROCEDURE — 81001 URINALYSIS AUTO W/SCOPE: CPT | Performed by: FAMILY MEDICINE

## 2025-04-11 PROCEDURE — 71275 CT ANGIOGRAPHY CHEST: CPT

## 2025-04-11 PROCEDURE — 85025 COMPLETE CBC W/AUTO DIFF WBC: CPT | Performed by: FAMILY MEDICINE

## 2025-04-11 PROCEDURE — 71275 CT ANGIOGRAPHY CHEST: CPT | Performed by: RADIOLOGY

## 2025-04-11 PROCEDURE — 85610 PROTHROMBIN TIME: CPT | Performed by: FAMILY MEDICINE

## 2025-04-11 PROCEDURE — 2500000004 HC RX 250 GENERAL PHARMACY W/ HCPCS (ALT 636 FOR OP/ED): Performed by: FAMILY MEDICINE

## 2025-04-11 PROCEDURE — 84075 ASSAY ALKALINE PHOSPHATASE: CPT | Performed by: FAMILY MEDICINE

## 2025-04-11 PROCEDURE — 2500000005 HC RX 250 GENERAL PHARMACY W/O HCPCS

## 2025-04-11 PROCEDURE — 2550000001 HC RX 255 CONTRASTS: Performed by: FAMILY MEDICINE

## 2025-04-11 PROCEDURE — 83880 ASSAY OF NATRIURETIC PEPTIDE: CPT | Performed by: FAMILY MEDICINE

## 2025-04-11 PROCEDURE — 85730 THROMBOPLASTIN TIME PARTIAL: CPT | Performed by: FAMILY MEDICINE

## 2025-04-11 RX ORDER — LIDOCAINE HYDROCHLORIDE 20 MG/ML
15 SOLUTION OROPHARYNGEAL ONCE
Status: COMPLETED | OUTPATIENT
Start: 2025-04-11 | End: 2025-04-11

## 2025-04-11 RX ORDER — ALUMINUM HYDROXIDE, MAGNESIUM HYDROXIDE, AND SIMETHICONE 1200; 120; 1200 MG/30ML; MG/30ML; MG/30ML
30 SUSPENSION ORAL ONCE
Status: COMPLETED | OUTPATIENT
Start: 2025-04-11 | End: 2025-04-11

## 2025-04-11 RX ORDER — LIDOCAINE HYDROCHLORIDE 20 MG/ML
SOLUTION OROPHARYNGEAL
Status: COMPLETED
Start: 2025-04-11 | End: 2025-04-11

## 2025-04-11 RX ORDER — PANTOPRAZOLE SODIUM 40 MG/1
40 TABLET, DELAYED RELEASE ORAL
Qty: 30 TABLET | Refills: 0 | Status: SHIPPED | OUTPATIENT
Start: 2025-04-11 | End: 2026-04-11

## 2025-04-11 RX ORDER — SODIUM CHLORIDE 9 MG/ML
125 INJECTION, SOLUTION INTRAVENOUS CONTINUOUS
Status: DISCONTINUED | OUTPATIENT
Start: 2025-04-11 | End: 2025-04-11 | Stop reason: HOSPADM

## 2025-04-11 RX ORDER — ALUMINUM HYDROXIDE, MAGNESIUM HYDROXIDE, AND SIMETHICONE 1200; 120; 1200 MG/30ML; MG/30ML; MG/30ML
SUSPENSION ORAL
Status: COMPLETED
Start: 2025-04-11 | End: 2025-04-11

## 2025-04-11 RX ADMIN — SODIUM CHLORIDE 125 ML/HR: 9 INJECTION, SOLUTION INTRAVENOUS at 14:47

## 2025-04-11 RX ADMIN — IOHEXOL 65 ML: 350 INJECTION, SOLUTION INTRAVENOUS at 14:58

## 2025-04-11 RX ADMIN — LIDOCAINE HYDROCHLORIDE 15 ML: 20 SOLUTION OROPHARYNGEAL at 16:16

## 2025-04-11 RX ADMIN — ALUMINUM HYDROXIDE, MAGNESIUM HYDROXIDE, AND SIMETHICONE 30 ML: 1200; 120; 1200 SUSPENSION ORAL at 16:16

## 2025-04-11 RX ADMIN — ALUMINUM HYDROXIDE, MAGNESIUM HYDROXIDE, AND DIMETHICONE 30 ML: 200; 20; 200 SUSPENSION ORAL at 16:16

## 2025-04-11 RX ADMIN — LIDOCAINE HYDROCHLORIDE 15 ML: 20 SOLUTION ORAL at 16:16

## 2025-04-11 ASSESSMENT — PAIN SCALES - GENERAL
PAINLEVEL_OUTOF10: 2
PAINLEVEL_OUTOF10: 2

## 2025-04-11 ASSESSMENT — COLUMBIA-SUICIDE SEVERITY RATING SCALE - C-SSRS
2. HAVE YOU ACTUALLY HAD ANY THOUGHTS OF KILLING YOURSELF?: NO
6. HAVE YOU EVER DONE ANYTHING, STARTED TO DO ANYTHING, OR PREPARED TO DO ANYTHING TO END YOUR LIFE?: NO
1. IN THE PAST MONTH, HAVE YOU WISHED YOU WERE DEAD OR WISHED YOU COULD GO TO SLEEP AND NOT WAKE UP?: NO

## 2025-04-11 ASSESSMENT — PAIN - FUNCTIONAL ASSESSMENT: PAIN_FUNCTIONAL_ASSESSMENT: 0-10

## 2025-04-11 ASSESSMENT — PAIN DESCRIPTION - ORIENTATION: ORIENTATION: UPPER

## 2025-04-11 ASSESSMENT — PAIN DESCRIPTION - PROGRESSION: CLINICAL_PROGRESSION: GRADUALLY WORSENING

## 2025-04-11 ASSESSMENT — PAIN DESCRIPTION - FREQUENCY: FREQUENCY: CONSTANT/CONTINUOUS

## 2025-04-11 ASSESSMENT — PAIN DESCRIPTION - ONSET: ONSET: GRADUAL

## 2025-04-11 ASSESSMENT — PAIN DESCRIPTION - PAIN TYPE: TYPE: ACUTE PAIN

## 2025-04-11 ASSESSMENT — PAIN DESCRIPTION - LOCATION: LOCATION: BACK

## 2025-04-11 NOTE — ED PROVIDER NOTES
HPI   Chief Complaint   Patient presents with    Back Pain    Heartburn       HPI  Patient is a 60-year-old female came to the emergency room complaint heartburn pain in the left shoulder blade area left arm she has prior history of cardiac workup including stress test which was negative.  She did never had cardiac cath though.  She is status post right hip replacement week 10 and decided come to ER because of above-mentioned symptoms.  Denies dizziness lightheaded palpitations however she admitted to some shortness of breath.  Denies hemoptysis.  Denies fever chills cough nausea vomiting diarrhea blood in stool or black stool or vomiting blood.  Denies history of peptic ulcer disease.  She has been taking baby aspirin lately.    She does have history of anal fissure with occasional bleeding but denies any new rectal bleed.  Denies any melena or blood clot in the stool.  She is not take any blood thinner.    Patient denies any abdominal pain or hematemesis hematochezia.    Family history: Reviewed  Social history: Reviewed, denies substance abuse.  Review of system: 10 review of system obtained review of systems as in HPI otherwise negative      Patient History   History reviewed. No pertinent past medical history.  Past Surgical History:   Procedure Laterality Date    KNEE ARTHROSCOPY W/ ARTHROTOMY Left      No family history on file.  Social History     Tobacco Use    Smoking status: Former     Current packs/day: 0.00     Average packs/day: 0.5 packs/day for 15.0 years (7.5 ttl pk-yrs)     Types: Cigarettes     Start date:      Quit date:      Years since quittin.2    Smokeless tobacco: Never   Vaping Use    Vaping status: Never Used   Substance Use Topics    Alcohol use: Not Currently     Comment: rarely    Drug use: Yes     Types: Other     Comment: uses gummies; stopped 1 week ago   Normal sinus rhythm rate of 81.  Nonspecific T wave abnormality no ST-T evaluation.  No STEMI.  Abnormal EKG. I read  this EKG.      Second EKG obtained at 1513 hrs. shows sinus bradycardia rate of 57.  Normal [place] interval normal QS complex no ST-T evaluation.  No STEMI.  Otherwise normal EKG.  I read this EKG.  Physical Exam   ED Triage Vitals [04/11/25 1402]   Temp Heart Rate Respirations BP   -- 80 20 156/89      SpO2 Temp Source Heart Rate Source Patient Position   100 % Temporal -- --      BP Location FiO2 (%)     -- --       Physical Exam  Constitutional:       Appearance: Normal appearance.      Comments: She was awake pleasant and cooperative did not look sick toxic or distressed affect healthy looking female vital significant symptom require further evaluation.  She was awake alert oriented x 3 talking breathing comfortably.  Her HEENT examination unremarkable neck is supple lungs are clear heart regular rate and rhythm vascular abdomen soft positive bowel sound nontender.  Calf muscle nontender Homans' sign negative intact distal pulse intact sensation no rashes no petechiae no ecchymosis or red streak.  No joint edema Krystyna neck was supple.   HENT:      Head: Normocephalic and atraumatic.      Nose: Nose normal.      Mouth/Throat:      Mouth: Mucous membranes are moist.   Eyes:      Extraocular Movements: Extraocular movements intact.      Conjunctiva/sclera: Conjunctivae normal.      Pupils: Pupils are equal, round, and reactive to light.   Neck:      Vascular: No carotid bruit.   Cardiovascular:      Rate and Rhythm: Normal rate and regular rhythm.      Pulses: Normal pulses.      Heart sounds: Normal heart sounds. No murmur heard.     No friction rub. No gallop.   Pulmonary:      Effort: Pulmonary effort is normal. No respiratory distress.      Breath sounds: Normal breath sounds. No stridor. No rales.   Abdominal:      General: Abdomen is flat. Bowel sounds are normal.      Palpations: Abdomen is soft.   Musculoskeletal:         General: No swelling, tenderness, deformity or signs of injury. Normal range of motion.       Cervical back: Normal range of motion and neck supple. No rigidity or tenderness.      Right lower leg: No edema.      Left lower leg: No edema.   Lymphadenopathy:      Cervical: No cervical adenopathy.   Neurological:      General: No focal deficit present.      Mental Status: She is alert and oriented to person, place, and time.      Cranial Nerves: No cranial nerve deficit.      Sensory: No sensory deficit.      Motor: No weakness.      Coordination: Coordination normal.      Gait: Gait normal.      Deep Tendon Reflexes: Reflexes normal.   Psychiatric:         Mood and Affect: Mood normal.         Behavior: Behavior normal.           ED Course & MDM   Diagnoses as of 04/11/25 1635   Multiple thyroid nodules   Chest pain, unspecified type   Heartburn   Patient is a 60-year-old female came to the emergency room complaint heartburn pain in the left shoulder blade area left arm she has prior history of cardiac workup including stress test which was negative.  She did never had cardiac cath though.  She is status post right hip replacement week 10 and decided come to ER because of above-mentioned symptoms.  Denies dizziness lightheaded palpitations however she admitted to some shortness of breath.           She was awake pleasant and cooperative did not look sick toxic or distressed affect healthy looking female vital significant symptom require further evaluation.  She was awake alert oriented x 3 talking breathing comfortably.  Her HEENT examination unremarkable neck is supple lungs are clear heart regular rate and rhythm vascular abdomen soft positive bowel sound nontender.  Calf muscle nontender Homans' sign negative intact distal pulse intact sensation no rashes no petechiae no ecchymosis or red streak.  No joint edema Krystyna neck was supple.    No data recorded     [place] Coma Scale Score: 15 (04/11/25 1402 : Inocencia Hardy RN)                           Medical Decision Making      Procedure  Procedures      Jose Wheeler MD  04/11/25 1509       Jose Wheeler MD  04/11/25 [id number]

## 2025-04-11 NOTE — DISCHARGE INSTRUCTIONS
As discussed you are found to have multiple nodules in the thyroid you need to have an outpatient ultrasound because thyroid nodules comes out because most of the thyroid nodules are noncancerous, thyroid nodule risk for cancer and must have outpatient evaluation to ensure stability and no progression to malignancy.    Your blood test of the heart attack was negative and there was no evidence of blood clot or pneumonia on chest CT.  However you are recommended to cardiologist for follow-up.  In addition you will see your primary care physician.  Since you have been having heartburn you will also benefit from seeing a gastroenterologist as referred.  Take Protonix as recommended if any problem concern return to ER

## 2025-04-12 LAB — HOLD SPECIMEN: NORMAL

## 2025-04-13 LAB — BACTERIA UR CULT: NO GROWTH

## 2025-04-15 ENCOUNTER — TREATMENT (OUTPATIENT)
Dept: PHYSICAL THERAPY | Facility: HOSPITAL | Age: 60
End: 2025-04-15
Payer: COMMERCIAL

## 2025-04-15 DIAGNOSIS — M16.11 UNILATERAL PRIMARY OSTEOARTHRITIS, RIGHT HIP: Primary | ICD-10-CM

## 2025-04-15 PROCEDURE — 97110 THERAPEUTIC EXERCISES: CPT | Mod: GP | Performed by: PHYSICAL THERAPIST

## 2025-04-15 ASSESSMENT — PAIN SCALES - GENERAL: PAINLEVEL_OUTOF10: 0 - NO PAIN

## 2025-04-15 NOTE — PROGRESS NOTES
Physical Therapy Treatment    Patient Name: Gabriela Sanford  MRN: [id number]  Today's Date: 4/15/2025  Payor: [employer]ICAL [employer] [facility] / Plan: MEDICAL [facility] HMO / Product Type: *No Product type* /   Time Calculation  Start Time: 1350  Stop Time: 1430  Time Calculation (min): 40 min        PT Therapeutic Procedures Time Entry  Therapeutic Exercise Time Entry: 40                  Current Problem  1. Unilateral primary osteoarthritis, right hip  Follow Up In Physical Therapy    Follow Up In Physical Therapy        Problem List Items Addressed This Visit    None  Visit Diagnoses         Codes    Unilateral primary osteoarthritis, right hip    -  Primary M16.11    Relevant Orders    Follow Up In Physical Therapy            General  Reason for Referral: s/p R SHIVA  Referred By: Chris Esposito  Past Medical History Relevant to Rehab: OA, stenosis    Subjective   Current Condition:   Better  Patient reports pt benefited from session    Performing HEP?: Yes    Precautions  Precautions  LE Weight Bearing Status: Weight Bearing as Tolerated  Medical Precautions: No known precautions/limitation  Pain  0-10 (Numeric) Pain Score: 0 - No pain    Objective                                ROM     Hip AROM (Degrees)                             (R)                      Flexion:            95                              Extension:        10                                   Abduction:       40                              Adduction:       WNL                              ER:                    50%                                          IR:                     50%                                                                    Strength Testing  Hip                             (R)                      Flexion:            4-                                                       Extension:        3                                           Abduction:       3                                             Adduction:       3                                              ER:                   3                                             IR:                     3  *improved all marks                                                                   Functional Screening     Posture: lateral shift     Lower Extremity Functional Movements  Transfers: Modified Independent  Gait: antalgic  Assistive Device: cane    Treatments:    Therapeutic Exercise  Therapeutic Exercise Performed: Yes  Therapeutic Exercise Activity 1: Physiostep x10  Therapeutic Exercise Activity 2: klein hip extension 3x10  Therapeutic Exercise Activity 3: klein back extension 3x10  Therapeutic Exercise Activity 4: LTR x20  Therapeutic Exercise Activity 5: prone quad st 2 min  Therapeutic Exercise Activity 6: piriformis st 3h30  Therapeutic Exercise Activity 7: SL hip [id number]                             EDUCATION:   Individual(s) Educated: Patient  Education Provided: on function  Handout(s) Provided: Scanned into chart  Home Program: progress as able  Risk and Benefits Discussed with Patient/Caregiver/Other: Yes   Patient/Caregiver Demonstrated Understanding: Yes   Patient Response to Education: Patient/Caregiver verbalized understanding of information, Patient/Caregiver performed return demonstration of exercises/activities, and Patient/Caregiver asked appropriate questions    Assessment:   Patient has bee displaying excellent progress in mobility, function, and strength. Patient would benefit from continued activity as per POC to address remaining deficits.       Plan: Continue with POC. Progress with mobility  OP PT Plan  Treatment/Interventions: Education/ Instruction, Gait training, Manual therapy, Neuromuscular re-education, Taping techniques, Therapeutic activities, Therapeutic exercises  PT Plan: Skilled PT  PT Frequency: 2 times per week  Duration: 4 weeks from 4/115/25  Onset Date: 02/03/25  Certification Period Start Date: 03/04/25  Certification Period End  Date: 05/13/25  Number of Treatments Authorized: 8/16... 20 for year  Rehab Potential: Excellent  Plan of Care Agreement: Patient    Goals:  Active       PT Problem       PT Goal 1: pt will display 90 degrees SLR  (Progressing)       Start:  03/04/25    Expected End:  04/15/25            PT Goal 2: patient will display equalized strength bl LE at 4/5  (Met)       Start:  03/04/25    Expected End:  04/15/25    Resolved:  04/15/25         PT Goal 3: patient will display ambulation without SPC and deviations (Met)       Start:  03/04/25    Expected End:  04/15/25    Resolved:  04/15/25         Patient Stated Goal 1: patient will return to leisure riding with motor trike with  wnl balance and mobility (Progressing)       Start:  03/04/25    Expected End:  04/15/25            Patient Stated Goal 2: patient will perform reciprical gait with stairs for 1 flight of stairs (Met)       Start:  03/04/25    Expected End:  04/15/25    Resolved:  04/15/25              Cleo Jerome, PT

## 2025-04-17 ENCOUNTER — TREATMENT (OUTPATIENT)
Dept: PHYSICAL THERAPY | Facility: HOSPITAL | Age: 60
End: 2025-04-17
Payer: COMMERCIAL

## 2025-04-17 DIAGNOSIS — M16.11 UNILATERAL PRIMARY OSTEOARTHRITIS, RIGHT HIP: ICD-10-CM

## 2025-04-17 PROCEDURE — 97110 THERAPEUTIC EXERCISES: CPT | Mod: GP

## 2025-04-17 ASSESSMENT — PAIN SCALES - GENERAL: PAINLEVEL_OUTOF10: 1

## 2025-04-17 ASSESSMENT — PAIN - FUNCTIONAL ASSESSMENT: PAIN_FUNCTIONAL_ASSESSMENT: 0-10

## 2025-04-17 NOTE — PROGRESS NOTES
Physical Therapy Treatment    Patient Name: Gabriela Sanford  MRN: [id number]  Today's Date: 4/17/2025  Payor: MEDICAL [facility] / Plan: MEDICAL [facility] HMO / Product Type: *No Product type* /   Time Calculation  Start Time: 1056  Stop Time: 1135  Time Calculation (min): 39 min        PT Therapeutic Procedures Time Entry  Therapeutic Exercise Time Entry: 39        Current Problem  1. Unilateral primary osteoarthritis, right hip  Follow Up In Physical Therapy        Problem List Items Addressed This Visit    None  Visit Diagnoses         Codes      Unilateral primary osteoarthritis, right hip     M16.11            General  Reason for Referral: s/p R SHIVA  Referred By: Chris Esposito  Past Medical History Relevant to Rehab: OA, stenosis    Subjective   Current Condition:   Better  Patient reports feeling a little stiff today, otherwise moving fine and feeling well.    Performing HEP?: Yes    Precautions  Precautions  LE Weight Bearing Status: Weight Bearing as Tolerated  Medical Precautions: No known precautions/limitation  Pain  Pain Assessment: 0-10  0-10 (Numeric) Pain Score: 1  Pain Type: Surgical pain  Pain Location: Hip  Pain Orientation: Right (stiffness)    Objective     Treatments:    Therapeutic Exercise  Therapeutic Exercise Performed: Yes  Therapeutic Exercise Activity 1: Physiostep x8 min  Therapeutic Exercise Activity 2: 2x8 mini squats  Therapeutic Exercise Activity 3: klein back extension 2x10  Therapeutic Exercise Activity 4: SL hip hinge 1x10 w x5#  Therapeutic Exercise Activity 5: 2x10 bridges  Therapeutic Exercise Activity 6: 1x10 clamshell w RTB     EDUCATION:   Individual(s) Educated: Patient  Education Provided: HEP  Handout(s) Provided: Scanned into chart  Home Program:     Access Code: [email or web address]  URL: [email or web address]  Date: 04/17/2025  Prepared by: Jessica Evans    Exercises  - Supine Hamstring Stretch with Strap  - 1 x daily - 7 x weekly - 3  sets - 10 reps  - Supine Lower Trunk Rotation  - 1 x daily - 7 x weekly - 3 sets - 10 reps  - Supine Butterfly Groin Stretch  - 1 x daily - 7 x weekly - 10 reps - 10 hold  - Single Leg Deadlift with Kettlebell  - 1 x daily - 7 x weekly - 1-2 sets - 10 reps  - Mini Squat  - 1 x daily - 7 x weekly - 1-2 sets - 10 reps    Risk and Benefits Discussed with Patient/Caregiver/Other: Yes   Patient/Caregiver Demonstrated Understanding: Yes   Patient Response to Education: Patient/Caregiver verbalized understanding of information, Patient/Caregiver performed return demonstration of exercises/activities, and Patient/Caregiver asked appropriate questions    Assessment:   Pt tolerating session well. Progressed slightly w hip strengthening exercises w a focus on the glutes. Pt progressing well towards goals but cont to report difficulty w ADLs including stair navigation. Pt cont to demo deficits as stated, and requires cont skilled PT intervention to assist pt in returning to PLOF.        Plan: Continue with POC. BLE ROM and strengthening per pt tolerance. May increase focus on stair navigation next visit.  OP PT Plan  Treatment/Interventions: Education/ Instruction, Gait training, Manual therapy, Neuromuscular re-education, Taping techniques, Therapeutic activities, Therapeutic exercises  PT Plan: Skilled PT  PT Frequency: 2 times per week  Duration: 4 weeks from 4/115/25  Onset Date: 02/03/25  Certification Period Start Date: 03/04/25  Certification Period End Date: 05/13/25  Number of Treatments Authorized: 9/16... 20 for year  Rehab Potential: Excellent  Plan of Care Agreement: Patient    Goals:  Active       PT Problem       PT Goal 1: pt will display 90 degrees SLR  (Progressing)       Start:  03/04/25    Expected End:  04/15/25            PT Goal 2: patient will display equalized strength bl LE at 4/5  (Met)       Start:  03/04/25    Expected End:  04/15/25    Resolved:  04/15/25         PT Goal 3: patient will display  ambulation without SPC and deviations (Met)       Start:  03/04/25    Expected End:  04/15/25    Resolved:  04/15/25         Patient Stated Goal 1: patient will return to leisure riding with motor trike with  wnl balance and mobility (Progressing)       Start:  03/04/25    Expected End:  04/15/25            Patient Stated Goal 2: patient will perform reciprical gait with stairs for 1 flight of stairs (Met)       Start:  03/04/25    Expected End:  04/15/25    Resolved:  04/15/25              Jessica Evans, PT

## 2025-04-18 ENCOUNTER — TELEPHONE (OUTPATIENT)
Dept: RADIOLOGY | Facility: HOSPITAL | Age: 60
End: 2025-04-18
Payer: COMMERCIAL

## 2025-04-18 NOTE — TELEPHONE ENCOUNTER
Gabriela is listed on the radiology actionable findings list based on imaging completed while hospitalized on 4/11/2025. Recommendation for a thyroid ultrasound per radiology. Called and spoke with Gabriela regarding the above. Gabriela was made aware of the finding and will be discussing with the new primary care provider, JERMAN Valle that she will be seeing on 4/29/2025.

## 2025-04-18 NOTE — PROGRESS NOTES
Subjective   Patient ID: Gabriela Sanford is a 60 y.o. female who presents for No chief complaint on file..    HPI     Review of Systems    Objective   There were no vitals taken for this visit.    Physical Exam    Assessment/Plan   {Assess/PlanSmartLinks:92605}        canals are clear of debris, TMs within normal limits, no oropharyngeal lesions, eomi, perrla   Neck: thyroid within normal limits, no lymphadenopathy   CV: RRR, normal S1/S2, no murmur   Resp: Clear to auscultation bilaterally, no wheezes or rhonchi appreciated  Abd: soft, nontender, non-distended, no guarding/rigidity, bowel sounds present  Extr: no edema, no calf tenderness  Derm: Skin is warm and dry, no rashes appreciated  Psych: mood is good, affect is congruent, good hygiene, normal speech and eye contact  Neuro: cranial nerves grossly intact, normal gait      Assessment/Plan   Diagnoses and all orders for this visit:  Multiple thyroid nodules  -     Tsh With Reflex To Free T4 If Abnormal; Future  -     T3, free; Future  -     Thyroid Peroxidase (TPO) Antibody; Future  -     US thyroid; Future  -     Anti-Thyroglobulin Antibody; Future  -     Cortisol; Future  Chest pain, unspecified type  -     pantoprazole (ProtoNix) 40 mg EC tablet; Take 1 tablet (40 mg) by mouth once daily in the morning. Take before meals. Do not crush, chew, or split.  Heartburn  -     pantoprazole (ProtoNix) 40 mg EC tablet; Take 1 tablet (40 mg) by mouth once daily in the morning. Take before meals. Do not crush, chew, or split.  Weight gain  -     Tsh With Reflex To Free T4 If Abnormal; Future  -     T3, free; Future  -     Thyroid Peroxidase (TPO) Antibody; Future  -     US thyroid; Future  -     Anti-Thyroglobulin Antibody; Future  -     Cortisol; Future  Lipid screening  -     Lipid panel; Future  Cold intolerance  -     Tsh With Reflex To Free T4 If Abnormal; Future  -     T3, free; Future  -     Thyroid Peroxidase (TPO) Antibody; Future  -     US thyroid; Future  -     Anti-Thyroglobulin Antibody; Future  -     Cortisol; Future      #HCM  Declines mammogram and or colonoscopy  -Feels they are unnecessary  -Discussed benefit vs risk of early vs late detection  -Continues to decline              [1]   Current Outpatient  Medications:     acetaminophen (Tylenol 8 HOUR) 650 mg ER tablet, Take 1 tablet by mouth every 6 hours if needed for mild pain (1 - 3) (pain ). Indications: pain, Disp: , Rfl:     aspirin 325 mg tablet, take 1 tablet by mouth everyday, Disp: 15 tablet, Rfl: 0    b complex 0.4 mg tablet, Take 1 tablet by mouth once daily., Disp: , Rfl:     cholecalciferol (Vitamin D-3) 50 mcg (2,000 unit) capsule, Take 2 capsules by mouth once daily. Indications: vitamin D supplament, Disp: , Rfl:     cholecalciferol, vitamin D3, (VITAMIN D3 ORAL), Take 1 tablet by mouth once daily., Disp: , Rfl:     diphenhydrAMINE (BENADryl) 25 mg capsule, Take 1 capsule (25 mg) by mouth., Disp: , Rfl:     ferrous sulfate, 325 mg ferrous sulfate, tablet, Take 1 tablet (325 mg) by mouth once daily with breakfast., Disp: , Rfl:     ibuprofen (Motrin) capsule, Take 2 capsules by mouth every 8 hours if needed (mild pain). Indications: pain, Disp: , Rfl:     medical cannabis, Take 1 each by mouth once daily. CBD with THC Gummies Tinmicheline, Disp: , Rfl:     pantoprazole (ProtoNix) 40 mg EC tablet, Take 1 tablet (40 mg) by mouth once daily in the morning. Take before meals. Do not crush, chew, or split., Disp: 30 tablet, Rfl: 0    sennosides (Senokot Extra Strength) 17.2 mg tablet, Take 1 tablet by mouth once daily as needed (constipation). Indications: constipation, Disp: , Rfl:   [2]   Allergies  Allergen Reactions    Ampicillin Unknown and GI Upset    Codeine Hives    Formaldehyde Swelling    Latex Swelling    Penicillins GI Upset    Cortisone Headache

## 2025-04-22 ENCOUNTER — TREATMENT (OUTPATIENT)
Dept: PHYSICAL THERAPY | Facility: HOSPITAL | Age: 60
End: 2025-04-22
Payer: COMMERCIAL

## 2025-04-22 DIAGNOSIS — M16.11 UNILATERAL PRIMARY OSTEOARTHRITIS, RIGHT HIP: Primary | ICD-10-CM

## 2025-04-22 PROCEDURE — 97110 THERAPEUTIC EXERCISES: CPT | Mod: GP

## 2025-04-22 ASSESSMENT — PAIN SCALES - GENERAL: PAINLEVEL_OUTOF10: 0 - NO PAIN

## 2025-04-22 ASSESSMENT — PAIN - FUNCTIONAL ASSESSMENT: PAIN_FUNCTIONAL_ASSESSMENT: 0-10

## 2025-04-22 NOTE — PROGRESS NOTES
Physical Therapy Treatment    Patient Name: Gabriela Sanford  MRN: [id number]  Today's Date: 4/22/2025  Payor: [employer]ICAL [employer] [facility] / Plan: MEDICAL [facility] HMO / Product Type: *No Product type* /   Time Calculation  Start Time: 1023  Stop Time: 1102  Time Calculation (min): 39 min        PT Therapeutic Procedures Time Entry  Therapeutic Exercise Time Entry: 39      Current Problem  1. Unilateral primary osteoarthritis, right hip  Follow Up In Physical Therapy    Follow Up In Physical Therapy    Follow Up In Physical Therapy        Problem List Items Addressed This Visit    None  Visit Diagnoses         Codes      Unilateral primary osteoarthritis, right hip    -  Primary M16.11    Relevant Orders    Follow Up In Physical Therapy    Follow Up In Physical Therapy            General  Reason for Referral: s/p R SHIVA  Referred By: Chris Esposito  Past Medical History Relevant to Rehab: OA, stenosis    Subjective   Current Condition:   Better  Patient reports there was a death in her family, so she had a sad holiday. Other than that she is doing fine. States she is still in the habit of doing step to pattern on the stairs, cont to fee weak in BLEs.    Performing HEP?: Yes    Precautions  Precautions  LE Weight Bearing Status: Weight Bearing as Tolerated  Medical Precautions: No known precautions/limitation  Pain  Pain Assessment: 0-10  0-10 (Numeric) Pain Score: 0 - No pain    Objective                             ROM     Hip AROM WFL logan                                   Strength Testing  Hip                             (R)                    (L)  Flexion:            5                      5                                    Extension:        3                    3+                        Abduction:       4                     5                         Treatments:    Therapeutic Exercise  Therapeutic Exercise Performed: Yes  Therapeutic Exercise Activity 1: Physiostep x8 min  Therapeutic Exercise Activity  2: recheck  Therapeutic Exercise Activity 3: HEP as below     EDUCATION:   Individual(s) Educated: Patient  Education Provided: HEP, POC  Handout(s) Provided: Scanned into chart  Home Program:     Access Code: [email or web address]  URL: [email or web address]  Date: 04/22/2025  Prepared by: Jessica Evans    Exercises  - Supine Lower Trunk Rotation  - 1 x daily - 7 x weekly - 2-3 sets - 10 reps  - Supine Piriformis Stretch with Foot on Ground  - 1 x daily - 7 x weekly - 2-3 sets - 20-30 sec hold  - Sidelying Hip Abduction  - 1 x daily - 7 x weekly - 2-3 sets - 10 reps  - Supine Gluteal Sets  - 1 x daily - 7 x weekly - 2-3 sets - 10 reps  - Standing Hip Extension with Counter Support  - 1 x daily - 7 x weekly - 2-3 sets - 10 reps    Risk and Benefits Discussed with Patient/Caregiver/Other: Yes   Patient/Caregiver Demonstrated Understanding: Yes   Patient Response to Education: Patient/Caregiver verbalized understanding of information, Patient/Caregiver performed return demonstration of exercises/activities, and Patient/Caregiver asked appropriate questions    Assessment:   Pt seen for PT re-assessment. Pt w fair improvement from initial eval, w good strength noted. However, pt cont to demo significant glute ext weakness and cont to have some difficulties w ADLs as stated above. HEP updated to refect progress. Pt cont to demo deficits as stated, and requires cont skilled PT intervention to assist pt in returning to PLOF.        Plan: Continue with POC. Freq to be ext as below, increase focus on glute ext strengthening,  OP PT Plan  Treatment/Interventions: Education/ Instruction, Gait training, Manual therapy, Neuromuscular re-education, Taping techniques, Therapeutic activities, Therapeutic exercises  PT Plan: Skilled PT  PT Frequency: 2 times per week  Duration: 4 weeks from 4/115/25  Onset Date: 02/03/25  Certification Period Start Date: 03/04/25  Certification Period End Date: 05/13/25  Number of  Treatments Authorized: 10/16... 20 for year  Rehab Potential: Excellent  Plan of Care Agreement: Patient    Goals:  Active       PT Problem       PT Goal 1: pt will display 90 degrees SLR  (Progressing)       Start:  03/04/25    Expected End:  05/27/25            PT Goal 2: patient will display equalized strength bl LE at 4/5  (Met)       Start:  03/04/25    Expected End:  04/15/25    Resolved:  04/15/25         PT Goal 3: patient will display ambulation without SPC and deviations (Met)       Start:  03/04/25    Expected End:  04/15/25    Resolved:  04/15/25         Patient Stated Goal 1: patient will return to leisure riding with motor trike with  wnl balance and mobility (Progressing)       Start:  03/04/25    Expected End:  05/27/25            Patient Stated Goal 2: patient will perform reciprical gait with stairs for 1 flight of stairs (Met)       Start:  03/04/25    Expected End:  04/15/25    Resolved:  04/15/25         Pt will improve hip abd/ext strength to 4+/5 or more as evidence of improved functional mobility.   (Progressing)       Start:  04/22/25    Expected End:  05/27/25                     Jessica Evans, PT

## 2025-04-28 ENCOUNTER — TREATMENT (OUTPATIENT)
Dept: PHYSICAL THERAPY | Facility: HOSPITAL | Age: 60
End: 2025-04-28
Payer: COMMERCIAL

## 2025-04-28 DIAGNOSIS — M16.11 UNILATERAL PRIMARY OSTEOARTHRITIS, RIGHT HIP: ICD-10-CM

## 2025-04-28 PROCEDURE — 97112 NEUROMUSCULAR REEDUCATION: CPT | Mod: GP | Performed by: PHYSICAL THERAPIST

## 2025-04-28 PROCEDURE — 97110 THERAPEUTIC EXERCISES: CPT | Mod: GP | Performed by: PHYSICAL THERAPIST

## 2025-04-28 ASSESSMENT — PAIN SCALES - GENERAL: PAINLEVEL_OUTOF10: 0 - NO PAIN

## 2025-04-28 NOTE — PROGRESS NOTES
Physical Therapy Treatment    Patient Name: Gabriela Sanford  MRN: [id number]  Today's Date: 4/28/2025  Payor: MEDICAL [employer] [facility] / Plan: MEDICAL [facility] HMO / Product Type: *No Product type* /   Time Calculation  Start Time: 1345  Stop Time: 1430  Time Calculation (min): 45 min        PT Therapeutic Procedures Time Entry  Manual Therapy Time Entry: 7  Neuromuscular Re-Education Time Entry: 13  Therapeutic Exercise Time Entry: 25                  Current Problem  1. Unilateral primary osteoarthritis, right hip  Follow Up In Physical Therapy        Problem List Items Addressed This Visit    None  Visit Diagnoses         Codes      Unilateral primary osteoarthritis, right hip     M16.11            General  Reason for Referral: s/p R SHIVA  Referred By: Chris Esposito  Past Medical History Relevant to Rehab: OA, stenosis    Subjective   Current Condition:   Better  Patient reports some stiffness in lateral hip towards gluteal insertion    Performing HEP?: Yes    Precautions  Precautions  LE Weight Bearing Status: Weight Bearing as Tolerated  Medical Precautions: No known precautions/limitation  Pain  0-10 (Numeric) Pain Score: 0 - No pain    Objective       Treatments:    Therapeutic Exercise  Therapeutic Exercise Performed: Yes  Therapeutic Exercise Activity 1: Physiostep x8 min  Therapeutic Exercise Activity 2: LTR x30  Therapeutic Exercise Activity 3: calf st 5h30  Therapeutic Exercise Activity 4: ITB st 3h30  Therapeutic Exercise Activity 5: glute st 10h10    Balance/Neuromuscular Re-Education  Balance/Neuromuscular Re-Education Activity Performed: Yes  Balance/Neuromuscular Re-Education Activity 1: PNF 1 flexion extension with contract relax at endrange    Manual Therapy  Manual Therapy Performed: Yes  Manual Therapy Activity 1: STM to gluteal insertion                   EDUCATION:   Individual(s) Educated: Patient  Education Provided: cuing on mobility  Handout(s) Provided: Scanned into  chart  Home Program: continue  Risk and Benefits Discussed with Patient/Caregiver/Other: Yes   Patient/Caregiver Demonstrated Understanding: Yes   Patient Response to Education: Patient/Caregiver verbalized understanding of information, Patient/Caregiver performed return demonstration of exercises/activities, and Patient/Caregiver asked appropriate questions    Assessment: pt benefited from session       Plan: Continue with POC.   OP PT Plan  Treatment/Interventions: Education/ Instruction, Gait training, Manual therapy, Neuromuscular re-education, Taping techniques, Therapeutic activities, Therapeutic exercises  PT Plan: Skilled PT  PT Frequency: 2 times per week  Duration: 4 weeks from 4/115/25  Onset Date: 02/03/25  Certification Period Start Date: 03/04/25  Certification Period End Date: 05/13/25  Number of Treatments Authorized: 11/16... 20 for year  Rehab Potential: Excellent  Plan of Care Agreement: Patient    Goals:  Active       PT Problem       PT Goal 1: pt will display 90 degrees SLR  (Progressing)       Start:  03/04/25    Expected End:  05/27/25            PT Goal 2: patient will display equalized strength bl LE at 4/5  (Met)       Start:  03/04/25    Expected End:  04/15/25    Resolved:  04/15/25         PT Goal 3: patient will display ambulation without SPC and deviations (Met)       Start:  03/04/25    Expected End:  04/15/25    Resolved:  04/15/25         Patient Stated Goal 1: patient will return to leisure riding with motor trike with  wnl balance and mobility (Progressing)       Start:  03/04/25    Expected End:  05/27/25            Patient Stated Goal 2: patient will perform reciprical gait with stairs for 1 flight of stairs (Met)       Start:  03/04/25    Expected End:  04/15/25    Resolved:  04/15/25         Pt will improve hip abd/ext strength to 4+/5 or more as evidence of improved functional mobility.   (Progressing)       Start:  04/22/25    Expected End:  05/27/25                      Cleo Jerome, PT

## 2025-04-29 ENCOUNTER — APPOINTMENT (OUTPATIENT)
Dept: PRIMARY CARE | Facility: CLINIC | Age: 60
End: 2025-04-29
Payer: COMMERCIAL

## 2025-04-29 VITALS
DIASTOLIC BLOOD PRESSURE: 96 MMHG | HEART RATE: 94 BPM | HEIGHT: 67 IN | WEIGHT: 230.2 LBS | BODY MASS INDEX: 36.13 KG/M2 | SYSTOLIC BLOOD PRESSURE: 156 MMHG

## 2025-04-29 DIAGNOSIS — E04.2 MULTIPLE THYROID NODULES: Primary | ICD-10-CM

## 2025-04-29 DIAGNOSIS — Z13.220 LIPID SCREENING: ICD-10-CM

## 2025-04-29 DIAGNOSIS — R07.9 CHEST PAIN, UNSPECIFIED TYPE: ICD-10-CM

## 2025-04-29 DIAGNOSIS — R12 HEARTBURN: ICD-10-CM

## 2025-04-29 DIAGNOSIS — R68.89 COLD INTOLERANCE: ICD-10-CM

## 2025-04-29 DIAGNOSIS — R63.5 WEIGHT GAIN: ICD-10-CM

## 2025-04-29 PROCEDURE — 3008F BODY MASS INDEX DOCD: CPT

## 2025-04-29 PROCEDURE — 99204 OFFICE O/P NEW MOD 45 MIN: CPT

## 2025-04-29 RX ORDER — PANTOPRAZOLE SODIUM 40 MG/1
40 TABLET, DELAYED RELEASE ORAL
Qty: 30 TABLET | Refills: 0 | Status: SHIPPED | OUTPATIENT
Start: 2025-04-29 | End: 2026-04-29

## 2025-04-29 NOTE — PATIENT INSTRUCTIONS
Radiology:  [place]:  [phone]    [place]:  [phone] opt 2      Schedule an appointment for labs at [email or web address] or call [phone] 24 hours a day, 7 days a week.   You can also download the [facility] lab scheduling anastasiya on your phone.

## 2025-05-01 ENCOUNTER — HOSPITAL ENCOUNTER (OUTPATIENT)
Dept: RADIOLOGY | Facility: HOSPITAL | Age: 60
Discharge: HOME | End: 2025-05-01
Payer: COMMERCIAL

## 2025-05-01 DIAGNOSIS — R63.5 WEIGHT GAIN: ICD-10-CM

## 2025-05-01 DIAGNOSIS — R68.89 COLD INTOLERANCE: ICD-10-CM

## 2025-05-01 DIAGNOSIS — E04.2 MULTIPLE THYROID NODULES: ICD-10-CM

## 2025-05-01 PROCEDURE — 76536 US EXAM OF HEAD AND NECK: CPT | Performed by: RADIOLOGY

## 2025-05-01 PROCEDURE — 76536 US EXAM OF HEAD AND NECK: CPT

## 2025-05-03 LAB
CHOLEST SERPL-MCNC: 239 MG/DL
CHOLEST/HDLC SERPL: 3.6 (CALC)
CORTIS SERPL-MCNC: 14.9 MCG/DL
HDLC SERPL-MCNC: 66 MG/DL
LDLC SERPL CALC-MCNC: 154 MG/DL (CALC)
NONHDLC SERPL-MCNC: 173 MG/DL (CALC)
T3FREE SERPL-MCNC: 3.3 PG/ML (ref 2.3–4.2)
THYROGLOB AB SERPL-ACNC: NORMAL [IU]/ML
THYROPEROXIDASE AB SERPL-ACNC: NORMAL [IU]/ML
TRIGL SERPL-MCNC: 85 MG/DL
TSH SERPL-ACNC: 1.69 MIU/L (ref 0.4–4.5)

## 2025-05-06 LAB
CHOLEST SERPL-MCNC: 239 MG/DL
CHOLEST/HDLC SERPL: 3.6 (CALC)
CORTIS SERPL-MCNC: 14.9 MCG/DL
HDLC SERPL-MCNC: 66 MG/DL
LDLC SERPL CALC-MCNC: 154 MG/DL (CALC)
NONHDLC SERPL-MCNC: 173 MG/DL (CALC)
T3FREE SERPL-MCNC: 3.3 PG/ML (ref 2.3–4.2)
THYROGLOB AB SERPL-ACNC: <1 IU/ML
THYROPEROXIDASE AB SERPL-ACNC: 165 IU/ML
TRIGL SERPL-MCNC: 85 MG/DL
TSH SERPL-ACNC: 1.69 MIU/L (ref 0.4–4.5)

## 2025-05-07 ENCOUNTER — TREATMENT (OUTPATIENT)
Dept: PHYSICAL THERAPY | Facility: HOSPITAL | Age: 60
End: 2025-05-07
Payer: COMMERCIAL

## 2025-05-07 DIAGNOSIS — M16.11 UNILATERAL PRIMARY OSTEOARTHRITIS, RIGHT HIP: ICD-10-CM

## 2025-05-07 PROCEDURE — 97530 THERAPEUTIC ACTIVITIES: CPT | Mod: GP

## 2025-05-07 PROCEDURE — 97110 THERAPEUTIC EXERCISES: CPT | Mod: GP

## 2025-05-07 ASSESSMENT — PAIN - FUNCTIONAL ASSESSMENT: PAIN_FUNCTIONAL_ASSESSMENT: 0-10

## 2025-05-07 ASSESSMENT — PAIN SCALES - GENERAL: PAINLEVEL_OUTOF10: 0 - NO PAIN

## 2025-05-07 NOTE — PROGRESS NOTES
Physical Therapy Treatment    Patient Name: Gabriela Sanford  MRN: [id number]  Today's Date: 5/7/2025  Payor: MEDICAL RAHUL [facility] / Plan: MEDICAL RAHUL [facility] HMO / Product Type: *No Product type* /   Time Calculation  Start Time: 1251  Stop Time: 1330  Time Calculation (min): 39 min        PT Therapeutic Procedures Time Entry  Therapeutic Exercise Time Entry: 29  Therapeutic Activity Time Entry: 10        Current Problem  1. Unilateral primary osteoarthritis, right hip  Follow Up In Physical Therapy        Problem List Items Addressed This Visit    None  Visit Diagnoses         Codes      Unilateral primary osteoarthritis, right hip     M16.11            General  Reason for Referral: s/p R SHIVA  Referred By: Chris Esposito  Past Medical History Relevant to Rehab: OA, stenosis    Subjective   Current Condition:   Better  Patient reports she is not having any pain. Has joined the [facility] and has started doing water classes for exercises.    Performing HEP?: Yes    Precautions  Precautions  LE Weight Bearing Status: Weight Bearing as Tolerated  Medical Precautions: No known precautions/limitation  Pain  Pain Assessment: 0-10  0-10 (Numeric) Pain Score: 0 - No pain    Objective     Treatments:    Therapeutic Exercise  Therapeutic Exercise Performed: Yes  Therapeutic Exercise Activity 1: Physiostep x8 min  Therapeutic Exercise Activity 2: seated hip ER stretch 2x30 sec logan  Therapeutic Exercise Activity 3: klein hip ext w knee bent 2x10 logan  Therapeutic Exercise Activity 4: klein glute focused back ext 3x10  Therapeutic Exercise Activity 5: mod rony stretch 2x20 sec  Therapeutic Exercise Activity 6: standing RDL w 5# DBs 2x10    Therapeutic Activity  Therapeutic Activity Performed: Yes  Therapeutic Activity 1: discussion, edu, and practice of donning/doffing shoes w/o assistive devices d/t reporting difficulty at the [facility] w/o her devices  Therapeutic Activity 2: edu on incision care and scar  management     EDUCATION:   Individual(s) Educated: Patient  Education Provided: how to adapt positioning for ADLs  Handout(s) Provided: Scanned into chart  Home Program: same as previous  Risk and Benefits Discussed with Patient/Caregiver/Other: Yes   Patient/Caregiver Demonstrated Understanding: Yes   Patient Response to Education: Patient/Caregiver verbalized understanding of information, Patient/Caregiver performed return demonstration of exercises/activities, and Patient/Caregiver asked appropriate questions    Assessment:   Pt tolerating session well. Continued w glute and posterior chain strengthening to correct deficits from recheck. Increased edu throughout session on techniques to enhance ADLs at home w good return noted. Pt cont to demo posterior hip weakness. Pt cont to demo deficits as stated, and requires cont skilled PT intervention to assist pt in returning to PLOF.        Plan: Continue with POC. Continue with core stability, LE strengthening, ROM, flexibility, and balance, so the patient can perform FA's without increased pain or difficulty.   OP PT Plan  Treatment/Interventions: Education/ Instruction, Gait training, Manual therapy, Neuromuscular re-education, Taping techniques, Therapeutic activities, Therapeutic exercises  PT Plan: Skilled PT  PT Frequency: 1 time per week (*updated from previous visits)  Duration: 4 weeks from 4/115/25  Onset Date: 02/03/25  Certification Period Start Date: 03/04/25  Certification Period End Date: 05/13/25  Number of Treatments Authorized: 2/4      ... 20 for year  Rehab Potential: Excellent  Plan of Care Agreement: Patient    Goals:  Active       PT Problem       PT Goal 1: pt will display 90 degrees SLR  (Progressing)       Start:  03/04/25    Expected End:  05/27/25            PT Goal 2: patient will display equalized strength bl LE at 4/5  (Met)       Start:  03/04/25    Expected End:  04/15/25    Resolved:  04/15/25         PT Goal 3: patient will display  ambulation without SPC and deviations (Met)       Start:  03/04/25    Expected End:  04/15/25    Resolved:  04/15/25         Patient Stated Goal 1: patient will return to leisure riding with motor trike with  wnl balance and mobility (Progressing)       Start:  03/04/25    Expected End:  05/27/25            Patient Stated Goal 2: patient will perform reciprical gait with stairs for 1 flight of stairs (Met)       Start:  03/04/25    Expected End:  04/15/25    Resolved:  04/15/25         Pt will improve hip abd/ext strength to 4+/5 or more as evidence of improved functional mobility.   (Progressing)       Start:  04/22/25    Expected End:  05/27/25                     Jessica Evans, PT

## 2025-05-09 DIAGNOSIS — E04.2 MULTIPLE THYROID NODULES: Primary | ICD-10-CM

## 2025-05-09 NOTE — RESULT ENCOUNTER NOTE
Called patient about her Thyroid US, Referring to endocrinology US thyroid with biopsy, ordered.  She has concerns about her BUN 23- higher end of normal. She has been worried about her kidney function, had been eating a lot of protein, recently decreased amount she is consuming. Concerns of cholesterol elevated- currently taking fish oil. Suggested Red Yeast Rice.

## 2025-05-12 ENCOUNTER — TREATMENT (OUTPATIENT)
Dept: PHYSICAL THERAPY | Facility: HOSPITAL | Age: 60
End: 2025-05-12
Payer: COMMERCIAL

## 2025-05-12 DIAGNOSIS — M16.11 UNILATERAL PRIMARY OSTEOARTHRITIS, RIGHT HIP: ICD-10-CM

## 2025-05-12 DIAGNOSIS — E04.2 MULTIPLE THYROID NODULES: ICD-10-CM

## 2025-05-12 PROCEDURE — 97110 THERAPEUTIC EXERCISES: CPT | Mod: GP

## 2025-05-12 ASSESSMENT — PAIN - FUNCTIONAL ASSESSMENT: PAIN_FUNCTIONAL_ASSESSMENT: 0-10

## 2025-05-12 ASSESSMENT — PAIN SCALES - GENERAL: PAINLEVEL_OUTOF10: 0 - NO PAIN

## 2025-05-12 NOTE — PROGRESS NOTES
Physical Therapy Treatment    Patient Name: Gabriela Sanford  MRN: [id number]  Today's Date: 5/12/2025  Payor: MEDICAL RAHUL [facility] / Plan: MEDICAL [facility] HMO / Product Type: *No Product type* /   Time Calculation  Start Time: 1300  Stop Time: 1342  Time Calculation (min): 42 min        PT Therapeutic Procedures Time Entry  Therapeutic Exercise Time Entry: 42      Current Problem  1. Unilateral primary osteoarthritis, right hip  Follow Up In Physical Therapy        Problem List Items Addressed This Visit    None  Visit Diagnoses         Codes      Unilateral primary osteoarthritis, right hip     M16.11            General  Reason for Referral: s/p R SHIVA  Referred By: Chris Esposito  Past Medical History Relevant to Rehab: OA, stenosis    Subjective   Current Condition:   Better  Patient reports feeling a little achy everywhere today but feels it is related to her thyroid. Otherwise doing fine, not much to report.    Performing HEP?: Yes    Precautions  Precautions  LE Weight Bearing Status: Weight Bearing as Tolerated  Medical Precautions: No known precautions/limitation  Pain  Pain Assessment: 0-10  0-10 (Numeric) Pain Score: 0 - No pain    Objective     Treatments:    Therapeutic Exercise  Therapeutic Exercise Performed: Yes  Therapeutic Exercise Activity 1: bike x8 min  Therapeutic Exercise Activity 2: seated hip ER stretch 2x30 sec logan  Therapeutic Exercise Activity 4: klein glute focused back ext 3x10  Therapeutic Exercise Activity 5: mod rony stretch 2x30 sec  Therapeutic Exercise Activity 6: standing RDL w 5# DBs 2x10  Therapeutic Exercise Activity 7: seated Hs stretch 2x30 sec  Therapeutic Exercise Activity 8: hip abd machine 2x8x40#  Therapeutic Exercise Activity 9: standing quad stretch 2x20 sec w foot on table  Therapeutic Exercise Activity 10: 2x30 sec calf stretch on incline    EDUCATION:   Individual(s) Educated: Patient  Education Provided: goals of new interventions  Handout(s)  Provided: Scanned into chart  Home Program: same as previous   Risk and Benefits Discussed with Patient/Caregiver/Other: Yes   Patient/Caregiver Demonstrated Understanding: Yes   Patient Response to Education: Patient/Caregiver verbalized understanding of information, Patient/Caregiver performed return demonstration of exercises/activities, and Patient/Caregiver asked appropriate questions    Assessment:   Pt tolerating session well. Continued w glute and posterior chain strengthening as previous. Pt progressing nicely towards goals. Pt cont to demo posterior hip weakness. Pt cont to demo deficits as stated, and requires cont skilled PT intervention to assist pt in returning to PLOF.        Plan: Continue with POC. Continue with core stability, LE strengthening, ROM, flexibility, and balance, so the patient can perform FA's without increased pain or difficulty.   OP PT Plan  Treatment/Interventions: Education/ Instruction, Gait training, Manual therapy, Neuromuscular re-education, Taping techniques, Therapeutic activities, Therapeutic exercises  PT Plan: Skilled PT  PT Frequency: 1 time per week (*updated from previous visits)  Duration: 4 weeks from 4/115/25  Onset Date: 02/03/25  Certification Period Start Date: 03/04/25  Certification Period End Date: 05/13/25  Number of Treatments Authorized: 3/4      ... 20 for year  Rehab Potential: Excellent  Plan of Care Agreement: Patient    Goals:  Active       PT Problem       PT Goal 1: pt will display 90 degrees SLR  (Progressing)       Start:  03/04/25    Expected End:  05/27/25            PT Goal 2: patient will display equalized strength bl LE at 4/5  (Met)       Start:  03/04/25    Expected End:  04/15/25    Resolved:  04/15/25         PT Goal 3: patient will display ambulation without SPC and deviations (Met)       Start:  03/04/25    Expected End:  04/15/25    Resolved:  04/15/25         Patient Stated Goal 1: patient will return to leisure riding with motor trike  with  wnl balance and mobility (Progressing)       Start:  03/04/25    Expected End:  05/27/25            Patient Stated Goal 2: patient will perform reciprical gait with stairs for 1 flight of stairs (Met)       Start:  03/04/25    Expected End:  04/15/25    Resolved:  04/15/25         Pt will improve hip abd/ext strength to 4+/5 or more as evidence of improved functional mobility.   (Progressing)       Start:  04/22/25    Expected End:  05/27/25                     Jessica Evans, PT

## 2025-05-13 PROBLEM — M16.10 ARTHRITIS OF HIP: Status: ACTIVE | Noted: 2025-05-13

## 2025-05-20 ENCOUNTER — TREATMENT (OUTPATIENT)
Dept: PHYSICAL THERAPY | Facility: HOSPITAL | Age: 60
End: 2025-05-20
Payer: COMMERCIAL

## 2025-05-20 DIAGNOSIS — M16.11 UNILATERAL PRIMARY OSTEOARTHRITIS, RIGHT HIP: ICD-10-CM

## 2025-05-20 PROCEDURE — 97110 THERAPEUTIC EXERCISES: CPT | Mod: GP

## 2025-05-20 ASSESSMENT — PAIN SCALES - GENERAL: PAINLEVEL_OUTOF10: 0 - NO PAIN

## 2025-05-20 ASSESSMENT — PAIN - FUNCTIONAL ASSESSMENT: PAIN_FUNCTIONAL_ASSESSMENT: 0-10

## 2025-05-20 NOTE — PROGRESS NOTES
Physical Therapy Treatment and Discharge    Patient Name: Gabriela Sanford  MRN: [id number]  Today's Date: 5/20/2025  Payor: MEDICAL RAHUL [facility] / Plan: MEDICAL [facility] HMO / Product Type: *No Product type* /   Time Calculation  Start Time: 1015  Stop Time: 1054  Time Calculation (min): 39 min        PT Therapeutic Procedures Time Entry  Therapeutic Exercise Time Entry: 39        Current Problem  1. Unilateral primary osteoarthritis, right hip  Follow Up In Physical Therapy        Problem List Items Addressed This Visit    None  Visit Diagnoses         Codes      Unilateral primary osteoarthritis, right hip     M16.11            General  Reason for Referral: s/p R SHIVA  Referred By: Chris Esposito  Past Medical History Relevant to Rehab: OA, stenosis    Subjective   Current Condition:   Better  Patient reports she is feeling good today. Has been going to exercises classes at the [facility] consistently. Denies any difficulties at home.     Performing HEP?: Yes    Precautions  Precautions  LE Weight Bearing Status: Weight Bearing as Tolerated  Medical Precautions: No known precautions/limitation  Pain  Pain Assessment: 0-10  0-10 (Numeric) Pain Score: 0 - No pain    Objective   ROM     Hip AROM WFL logan                                   Strength Testing  Hip                             (R)                    (L)  Flexion:            5                      5                                    Extension:        3+                    4-                        Abduction:       5                     5                         Outcome Measures:  LEFs 56    Treatments:    Therapeutic Exercise  Therapeutic Exercise Performed: Yes  Therapeutic Exercise Activity 1: physio step x10 min  Therapeutic Exercise Activity 2: recheck  Therapeutic Exercise Activity 3: HEP as below    EDUCATION:   Individual(s) Educated: Patient  Education Provided: HEP, POC  Handout(s) Provided: Scanned into chart  Home Program:     Access  Code: [email or web address]  URL[email or web address]  Date: 05/20/2025  Prepared by: Jessica Evans    Exercises  - Step Up  - 1-2 x daily - 7 x weekly - 2 sets - 8-10 reps  - Kettlebell Deadlift  - 1-2 x daily - 7 x weekly - 2 sets - 8-10 reps  - Standing Hip Extension with Counter Support  - 1-2 x daily - 7 x weekly - 2 sets - 10 reps  - Standing Quadriceps Stretch  - 1-2 x daily - 7 x weekly - 2-3 reps - 20-30 sec hold  - Seated Hip External Rotation Stretch  - 1-2 x daily - 7 x weekly - 2-3 reps - 20-30 sec hold  - Modified Ang Stretch  - 1-2 x daily - 7 x weekly - 2-3 reps - 20-30 sec hold    Risk and Benefits Discussed with Patient/Caregiver/Other: Yes   Patient/Caregiver Demonstrated Understanding: Yes   Patient Response to Education: Patient/Caregiver verbalized understanding of information, Patient/Caregiver performed return demonstration of exercises/activities, and Patient/Caregiver asked appropriate questions    Assessment:   Pt has made significant progress since beginning PT. Pt states that she no longer has difficulties w strength or ROM at this time. Pt has met all goals. D/t pt progress, pt will be DC from OPPT services this date. Pt edu on performing HEP for next few weeks to maintain progress and pt verbalized understanding.       Plan: DC from OPPT  OP PT Plan  Treatment/Interventions: Education/ Instruction, Gait training, Manual therapy, Neuromuscular re-education, Taping techniques, Therapeutic activities, Therapeutic exercises  PT Plan: Skilled PT  PT Frequency: 1 time per week (*updated from previous visits)  Duration: 4 weeks from 4/115/25  Onset Date: 02/03/25  Certification Period Start Date: 03/04/25  Certification Period End Date: 05/13/25  Number of Treatments Authorized: 4/4      ... 20 for year  Rehab Potential: Excellent  Plan of Care Agreement: Patient    Goals:  Resolved       PT Problem       PT Goal 1: pt will display 90 degrees SLR  (Met)       Start:   03/04/25    Expected End:  05/27/25    Resolved:  05/20/25         PT Goal 2: patient will display equalized strength bl LE at 4/5  (Met)       Start:  03/04/25    Expected End:  04/15/25    Resolved:  04/15/25         PT Goal 3: patient will display ambulation without SPC and deviations (Met)       Start:  03/04/25    Expected End:  04/15/25    Resolved:  04/15/25         Patient Stated Goal 1: patient will return to leisure riding with motor trike with  wnl balance and mobility (Adequate for Discharge)       Start:  03/04/25    Expected End:  05/27/25            Patient Stated Goal 2: patient will perform reciprical gait with stairs for 1 flight of stairs (Met)       Start:  03/04/25    Expected End:  04/15/25    Resolved:  04/15/25         Pt will improve hip abd/ext strength to 4+/5 or more as evidence of improved functional mobility.   (Adequate for Discharge)       Start:  04/22/25    Expected End:  05/27/25                     Jessica Evans, PT

## 2025-05-21 ENCOUNTER — APPOINTMENT (OUTPATIENT)
Dept: CARDIOLOGY | Facility: CLINIC | Age: 60
End: 2025-05-21
Payer: COMMERCIAL

## 2025-05-21 ENCOUNTER — HOSPITAL ENCOUNTER (OUTPATIENT)
Dept: RADIOLOGY | Facility: HOSPITAL | Age: 60
Discharge: HOME | End: 2025-05-21
Payer: COMMERCIAL

## 2025-05-21 VITALS
HEART RATE: 72 BPM | OXYGEN SATURATION: 97 % | RESPIRATION RATE: 18 BRPM | SYSTOLIC BLOOD PRESSURE: 158 MMHG | DIASTOLIC BLOOD PRESSURE: 83 MMHG

## 2025-05-21 DIAGNOSIS — E04.2 MULTIPLE THYROID NODULES: ICD-10-CM

## 2025-05-21 PROCEDURE — 2500000004 HC RX 250 GENERAL PHARMACY W/ HCPCS (ALT 636 FOR OP/ED): Performed by: NURSE PRACTITIONER

## 2025-05-21 PROCEDURE — 88112 CYTOPATH CELL ENHANCE TECH: CPT | Mod: TC

## 2025-05-21 PROCEDURE — 10005 FNA BX W/US GDN 1ST LES: CPT

## 2025-05-21 RX ORDER — LIDOCAINE HYDROCHLORIDE 10 MG/ML
INJECTION, SOLUTION EPIDURAL; INFILTRATION; INTRACAUDAL; PERINEURAL
Status: COMPLETED | OUTPATIENT
Start: 2025-05-21 | End: 2025-05-21

## 2025-05-21 RX ADMIN — LIDOCAINE HYDROCHLORIDE 10 ML: 10 INJECTION, SOLUTION EPIDURAL; INFILTRATION; INTRACAUDAL; PERINEURAL at 13:17

## 2025-05-21 ASSESSMENT — PAIN SCALES - GENERAL
PAINLEVEL_OUTOF10: 0 - NO PAIN
PAINLEVEL_OUTOF10: 0 - NO PAIN

## 2025-05-21 ASSESSMENT — PAIN - FUNCTIONAL ASSESSMENT: PAIN_FUNCTIONAL_ASSESSMENT: 0-10

## 2025-05-21 NOTE — POST-PROCEDURE NOTE
Interventional Radiology Brief Postprocedure Note    Attending: JERMAN River    Assistant: none    Diagnosis: TR IV right mid/inf thyroid nodule    Description of procedure: A Fine Needle Aspiration was performed of the 1.5cm nodule of the right mid lobe of the thyroid.      Anesthesia:  Local    Complications: None    Estimated Blood Loss: minimal    Medications (Filter: Administrations occurring from 1300 to 1328 on 05/21/25) As of 05/21/25 1328      lidocaine PF (Xylocaine) 10 mg/mL (1 %) injection (mL) Total volume:  10 mL      Date/Time Rate/Dose/Volume Action       05/21/25  1317 10 mL Given                   ID Type Source Tests Collected by Time   1 : Right mid thyroid FNA Non-Gynecologic Cytology THYROID FINE NEEDLE ASPIRATION RIGHT MID LOBE CYTOLOGY CONSULTATION (NON-GYNECOLOGIC) JERMAN River 5/21/2025 1324         See detailed result report with images in PACS.    The patient tolerated the procedure well without incident or complication and is in stable condition.

## 2025-05-21 NOTE — PRE-PROCEDURE NOTE
Interventional Radiology Preprocedure Note    Indication for procedure: The encounter diagnosis was Multiple thyroid nodules.    Relevant review of systems: + weight gain and fatigue    Relevant Labs:   Lab Results   Component Value Date    CREATININE 0.68 04/11/2025    EGFR >90 04/11/2025    INR 0.9 04/11/2025    PROTIME 10.4 04/11/2025       Planned Sedation/Anesthesia: local    Airway assessment: normal    Directed physical examination:    A&Ox3  Breathing Unlabored  Neck supple    US thyroid reviewed, with multiple nodules as noted.   The right mid/inferior 1.5cm TR IV nodule meets criteria for FNA and will be biopsied today.     Remaining nodules should be followed with repeat imaging per ACR recommendations.     Benefits, risks and alternatives of procedure and planned sedation have been discussed with the patient and/or their representative. All questions answered and they agree to proceed.

## 2025-05-22 ENCOUNTER — TELEPHONE VISIT (OUTPATIENT)
Dept: RADIOLOGY | Facility: HOSPITAL | Age: 60
End: 2025-05-22
Payer: COMMERCIAL

## 2025-05-22 LAB
LABORATORY COMMENT REPORT: NORMAL
LABORATORY COMMENT REPORT: NORMAL
PATH REPORT.FINAL DX SPEC: NORMAL
PATH REPORT.GROSS SPEC: NORMAL
PATH REPORT.TOTAL CANCER: NORMAL

## 2025-05-23 ENCOUNTER — APPOINTMENT (OUTPATIENT)
Dept: PRIMARY CARE | Facility: CLINIC | Age: 60
End: 2025-05-23
Payer: COMMERCIAL

## 2025-06-03 NOTE — PROGRESS NOTES
Subjective   Patient ID: Gabriela Sanford is a 60 y.o. female who presents for Results (Discuss fine needle results and next steps).    HPI    Gabriela is here for follow up after thyroid biopsy.   She is still having symptoms of weight gain regardless of eating habits,   fatigue, cold/heat intolerance, itchy dry skin especially around her ankles, and constipation.     She has a history of GERD which has been manageable with Protonix in the past but is no longer working.     Current Medications[1]      Objective     /84 (BP Location: Right arm, Patient Position: Sitting, BP Cuff Size: Adult)   Pulse 76   Wt 105 kg (232 lb 3.2 oz)   BMI 36.37 kg/m²     Problem List Items Addressed This Visit    None    Physical Exam    Gen: No acute distress, alert and oriented x3, pleasant   HEENT: moist mucous membranes, b/l external auditory canals are clear of debris, TMs within normal limits, no oropharyngeal lesions, eomi, perrla   Neck: thyroid within normal limits, no lymphadenopathy   CV: RRR, normal S1/S2, no murmur   Resp: Clear to auscultation bilaterally, no wheezes or rhonchi appreciated  Abd: soft, nontender, non-distended, no guarding/rigidity, bowel sounds present  Extr: no edema, no calf tenderness  Derm: Skin is warm and dry, no rashes appreciated  Psych: mood is good, affect is congruent, good hygiene, normal speech and eye contact  Neuro: cranial nerves grossly intact, normal gait      Assessment/Plan   Diagnoses and all orders for this visit:  Multiple thyroid nodules  Anti-TPO antibodies present  -     levothyroxine (Synthroid, Levoxyl) 25 mcg tablet; Take 1 tablet (25 mcg) by mouth early in the morning.. Take on an empty stomach at the same time each day, either 30 to 60 minutes prior to breakfast  -     Tsh With Reflex To Free T4 If Abnormal; Future    Encounter for screening mammogram for malignant neoplasm of breast  -     BI mammo bilateral screening tomosynthesis; Future    Gastroesophageal reflux  disease, unspecified whether esophagitis present  -     sucralfate (Carafate) 100 mg/mL suspension; Take 10 mL (1 g) by mouth 4 times a day with meals.  -     Esophagogastroduodenoscopy (EGD); Future    SK (seborrheic keratosis)  -     Referral to Dermatology     #HCM  Declines mammogram and or colonoscopy  -Feels they are unnecessary  -Discussed benefit vs risk of early vs late detection  -Continues to decline         [1]   Current Outpatient Medications:     acetaminophen (Tylenol 8 HOUR) 650 mg ER tablet, Take 1 tablet (650 mg) by mouth every 6 hours if needed for mild pain (1 - 3) (pain )., Disp: , Rfl:     aspirin 325 mg tablet, take 1 tablet by mouth everyday, Disp: 15 tablet, Rfl: 0    b complex 0.4 mg tablet, Take 1 tablet by mouth once daily., Disp: , Rfl:     cholecalciferol (Vitamin D-3) 50 mcg (2,000 unit) capsule, Take 2 capsules (100 mcg) by mouth once daily., Disp: , Rfl:     cholecalciferol, vitamin D3, (VITAMIN D3 ORAL), Take 1 tablet by mouth once daily., Disp: , Rfl:     diphenhydrAMINE (BENADryl) 25 mg capsule, Take 1 capsule (25 mg) by mouth., Disp: , Rfl:     ferrous sulfate, 325 mg ferrous sulfate, tablet, Take 1 tablet by mouth once daily with breakfast., Disp: , Rfl:     ibuprofen (Motrin) capsule, Take 2 capsules (400 mg) by mouth every 8 hours if needed (mild pain)., Disp: , Rfl:     medical cannabis, Take 1 each by mouth once daily. CBD with THC Gummies Jeffery, Disp: , Rfl:     pantoprazole (ProtoNix) 40 mg EC tablet, TAKE 1 TABLET BY MOUTH ONCE DAILY IN THE MORNING, DO NOT CRUSH, CHEW OR SPLIT, Disp: 30 tablet, Rfl: 0    sennosides (Senokot Extra Strength) 17.2 mg tablet, Take 1 tablet (17.2 mg) by mouth once daily as needed (constipation)., Disp: , Rfl:

## 2025-06-10 ENCOUNTER — APPOINTMENT (OUTPATIENT)
Dept: PRIMARY CARE | Facility: CLINIC | Age: 60
End: 2025-06-10
Payer: COMMERCIAL

## 2025-06-10 DIAGNOSIS — R12 HEARTBURN: ICD-10-CM

## 2025-06-10 DIAGNOSIS — R07.9 CHEST PAIN, UNSPECIFIED TYPE: ICD-10-CM

## 2025-06-11 RX ORDER — PANTOPRAZOLE SODIUM 40 MG/1
40 TABLET, DELAYED RELEASE ORAL
Qty: 30 TABLET | Refills: 0 | Status: SHIPPED | OUTPATIENT
Start: 2025-06-11

## 2025-06-12 ENCOUNTER — APPOINTMENT (OUTPATIENT)
Dept: PRIMARY CARE | Facility: CLINIC | Age: 60
End: 2025-06-12
Payer: COMMERCIAL

## 2025-06-12 VITALS
BODY MASS INDEX: 36.37 KG/M2 | HEART RATE: 76 BPM | WEIGHT: 232.2 LBS | SYSTOLIC BLOOD PRESSURE: 129 MMHG | DIASTOLIC BLOOD PRESSURE: 84 MMHG

## 2025-06-12 DIAGNOSIS — Z12.31 ENCOUNTER FOR SCREENING MAMMOGRAM FOR MALIGNANT NEOPLASM OF BREAST: ICD-10-CM

## 2025-06-12 DIAGNOSIS — L82.1 SK (SEBORRHEIC KERATOSIS): ICD-10-CM

## 2025-06-12 DIAGNOSIS — R68.89 COLD INTOLERANCE: ICD-10-CM

## 2025-06-12 DIAGNOSIS — E03.9 HYPOTHYROIDISM, UNSPECIFIED TYPE: ICD-10-CM

## 2025-06-12 DIAGNOSIS — R76.8 ANTI-TPO ANTIBODIES PRESENT: ICD-10-CM

## 2025-06-12 DIAGNOSIS — K21.9 GASTROESOPHAGEAL REFLUX DISEASE, UNSPECIFIED WHETHER ESOPHAGITIS PRESENT: ICD-10-CM

## 2025-06-12 DIAGNOSIS — E04.2 MULTIPLE THYROID NODULES: Primary | ICD-10-CM

## 2025-06-12 PROCEDURE — 99214 OFFICE O/P EST MOD 30 MIN: CPT

## 2025-06-12 RX ORDER — LEVOTHYROXINE SODIUM 25 UG/1
25 TABLET ORAL DAILY
Qty: 30 TABLET | Refills: 11 | Status: SHIPPED | OUTPATIENT
Start: 2025-06-12 | End: 2026-06-12

## 2025-06-12 RX ORDER — SUCRALFATE 1 G/10ML
1 SUSPENSION ORAL
Qty: 1200 ML | Refills: 11 | Status: SHIPPED | OUTPATIENT
Start: 2025-06-12 | End: 2026-06-12

## 2025-06-12 NOTE — PATIENT INSTRUCTIONS
Dermatology:  [facility] [phone]  Susan Mayne, CNP ([facility]) [phone]  Addie Green ([facility]) [phone]  Miguel Dash () [phone]  [facility] ([place]) [phone]       Radiology [phone]    Gastroenterology:  Emerald Kitchen () [phone]    Surgery:  Jerry Galo () [phone]  Arthur Dyer ()  -Endoscopy ([place] and [place] scheduling) [phone]

## 2025-06-25 ENCOUNTER — APPOINTMENT (OUTPATIENT)
Dept: RADIOLOGY | Facility: HOSPITAL | Age: 60
End: 2025-06-25
Payer: COMMERCIAL

## 2025-06-25 ENCOUNTER — HOSPITAL ENCOUNTER (EMERGENCY)
Facility: HOSPITAL | Age: 60
Discharge: HOME | End: 2025-06-25
Attending: FAMILY MEDICINE
Payer: COMMERCIAL

## 2025-06-25 VITALS
DIASTOLIC BLOOD PRESSURE: 85 MMHG | HEIGHT: 66 IN | HEART RATE: 92 BPM | BODY MASS INDEX: 36.96 KG/M2 | OXYGEN SATURATION: 100 % | SYSTOLIC BLOOD PRESSURE: 146 MMHG | TEMPERATURE: 97.6 F | RESPIRATION RATE: 17 BRPM | WEIGHT: 230 LBS

## 2025-06-25 DIAGNOSIS — K57.92 ACUTE DIVERTICULITIS: ICD-10-CM

## 2025-06-25 DIAGNOSIS — Z87.39 HISTORY OF SPINAL STENOSIS: ICD-10-CM

## 2025-06-25 DIAGNOSIS — M54.42 LOW BACK PAIN WITH LEFT-SIDED SCIATICA, UNSPECIFIED BACK PAIN LATERALITY, UNSPECIFIED CHRONICITY: Primary | ICD-10-CM

## 2025-06-25 DIAGNOSIS — K62.9 RECTAL LESION: ICD-10-CM

## 2025-06-25 LAB
APPEARANCE UR: CLEAR
BILIRUB UR STRIP.AUTO-MCNC: NEGATIVE MG/DL
COLOR UR: ABNORMAL
GLUCOSE UR STRIP.AUTO-MCNC: NEGATIVE MG/DL
KETONES UR STRIP.AUTO-MCNC: ABNORMAL MG/DL
LEUKOCYTE ESTERASE UR QL STRIP.AUTO: NEGATIVE
NITRITE UR QL STRIP.AUTO: NEGATIVE
PH UR STRIP.AUTO: 7 [PH]
PROT UR STRIP.AUTO-MCNC: NEGATIVE MG/DL
RBC # UR STRIP.AUTO: ABNORMAL MG/DL
RBC #/AREA URNS AUTO: ABNORMAL /HPF
SP GR UR STRIP.AUTO: 1
SQUAMOUS #/AREA URNS AUTO: ABNORMAL /HPF
UROBILINOGEN UR STRIP.AUTO-MCNC: <2 MG/DL
WBC #/AREA URNS AUTO: ABNORMAL /HPF

## 2025-06-25 PROCEDURE — 2500000001 HC RX 250 WO HCPCS SELF ADMINISTERED DRUGS (ALT 637 FOR MEDICARE OP)

## 2025-06-25 PROCEDURE — 99284 EMERGENCY DEPT VISIT MOD MDM: CPT | Mod: 25 | Performed by: FAMILY MEDICINE

## 2025-06-25 PROCEDURE — 74176 CT ABD & PELVIS W/O CONTRAST: CPT

## 2025-06-25 PROCEDURE — 72131 CT LUMBAR SPINE W/O DYE: CPT | Performed by: RADIOLOGY

## 2025-06-25 PROCEDURE — 81001 URINALYSIS AUTO W/SCOPE: CPT | Performed by: FAMILY MEDICINE

## 2025-06-25 PROCEDURE — 74176 CT ABD & PELVIS W/O CONTRAST: CPT | Performed by: RADIOLOGY

## 2025-06-25 RX ORDER — LEVOFLOXACIN 500 MG/1
500 TABLET, FILM COATED ORAL DAILY
Qty: 7 TABLET | Refills: 0 | Status: SHIPPED | OUTPATIENT
Start: 2025-06-25 | End: 2025-07-02

## 2025-06-25 RX ORDER — HYDROCODONE BITARTRATE AND ACETAMINOPHEN 5; 325 MG/1; MG/1
TABLET ORAL
Status: COMPLETED
Start: 2025-06-25 | End: 2025-06-25

## 2025-06-25 RX ORDER — HYDROCODONE BITARTRATE AND ACETAMINOPHEN 5; 325 MG/1; MG/1
1 TABLET ORAL EVERY 6 HOURS PRN
Qty: 20 TABLET | Refills: 0 | Status: SHIPPED | OUTPATIENT
Start: 2025-06-25 | End: 2025-06-28

## 2025-06-25 RX ORDER — LEVOFLOXACIN 250 MG/1
TABLET, FILM COATED ORAL
Status: COMPLETED
Start: 2025-06-25 | End: 2025-06-25

## 2025-06-25 RX ORDER — LEVOFLOXACIN 250 MG/1
500 TABLET, FILM COATED ORAL ONCE
Status: COMPLETED | OUTPATIENT
Start: 2025-06-25 | End: 2025-06-25

## 2025-06-25 RX ORDER — HYDROCODONE BITARTRATE AND ACETAMINOPHEN 5; 325 MG/1; MG/1
1 TABLET ORAL ONCE
Refills: 0 | Status: COMPLETED | OUTPATIENT
Start: 2025-06-25 | End: 2025-06-25

## 2025-06-25 RX ADMIN — HYDROCODONE BITARTRATE AND ACETAMINOPHEN 1 TABLET: 5; 325 TABLET ORAL at 15:45

## 2025-06-25 RX ADMIN — LEVOFLOXACIN 500 MG: 250 TABLET, FILM COATED ORAL at 15:45

## 2025-06-25 ASSESSMENT — PAIN DESCRIPTION - DESCRIPTORS
DESCRIPTORS: ACHING
DESCRIPTORS: ACHING

## 2025-06-25 ASSESSMENT — PAIN DESCRIPTION - ORIENTATION
ORIENTATION: LOWER
ORIENTATION: LOWER

## 2025-06-25 ASSESSMENT — PAIN - FUNCTIONAL ASSESSMENT
PAIN_FUNCTIONAL_ASSESSMENT: 0-10
PAIN_FUNCTIONAL_ASSESSMENT: 0-10

## 2025-06-25 ASSESSMENT — PAIN DESCRIPTION - LOCATION
LOCATION: BACK
LOCATION: BACK

## 2025-06-25 ASSESSMENT — PAIN SCALES - GENERAL
PAINLEVEL_OUTOF10: 8
PAINLEVEL_OUTOF10: 6

## 2025-06-25 ASSESSMENT — PAIN DESCRIPTION - PAIN TYPE
TYPE: ACUTE PAIN
TYPE: ACUTE PAIN

## 2025-06-25 NOTE — DISCHARGE INSTRUCTIONS
As discussed you do have rectal lesion thickening of the rectal wall as well as acute diverticulitis, hydronephrosis or kidney stone, gallstones without evidence of gallstone infection or gallbladder infection and multiple other findings on the CAT scan which were reviewed with you in detail your report drawn on the computer screen and explained everything finding on the CAT scan for you.  Specialist for colonoscopy refer to Dr. Kearney at [facility] for follow-up.  You will need to follow-up with  In addition you are also found to have disc disease spinal stenosis and nerve impingement multiple level especially L4-L5.  You need to find follow-up with a spine specialist.,  Referred to spine specialist in [facility].  Also follow-up with your primary care physician.  Do not drive or use machinery while taking Vicodin.  If any problem concern return to ER.  As well as diverticulitis concern most people with diverticulitis needs to be admitted how your mild degree diverticulitis 3 problems to be on clear liquid diet use antibiotic.  Please make sure you follow-up with Dr. Galo at his surgical clinic as you will need a colonoscopy, umbilical lesion cannot be entirely excluded without complete evaluation biopsy to rule out any lesion in the sigmoid rectum or colon.  Initial also evaluation.  Diverticulitis improves.  His abdominal symptoms are worse return to ER immediately.  You have decided to be discharged send admission but promised to be on clear liquid diet for 12 next 2 to 3 days while taking antibiotic for diverticulitis.

## 2025-06-25 NOTE — ED PROVIDER NOTES
"  Emergency Department Provider Note       History of Present Illness     History provided by: Patient  Limitations to History: None  External Records Reviewed with Brief Summary: None    HPI:  Gabriela Sanford is a 60 y.o. female came to the emergency room with complaint of low back pain    Physical Exam   Triage vitals:  T    HR    BP    RR    O2        {ED Physical Exam:56524}      Medical Decision Making & ED Course   Medical Decision Makin y.o. female ***  ----  {Scoring Tools Utilized:32901}    Differential diagnoses considered include but are not limited to: ***    Social Determinants of Health which Significantly Impact Care: {Social Determinants of Health which Significantly Impact Care:48732} {The following actions were taken to address these social determinants (Optional):67999}    EKG Independent Interpretation: {EKG Independent Interpretation:22754}    Independent Result Review and Interpretation: {Independent Result Review and Interpretation:66078::\"Relevant laboratory and radiographic results were reviewed and independently interpreted by myself.  As necessary, they are commented on in the ED Course.\"}    Chronic conditions affecting the patient's care: {Chronic conditions affecting the patient's care:13775::\"As documented above in MDM\"}    The patient was discussed with the following consultants/services: {The patient was discussed with the following consultants/services:50707}    Care Considerations: {Care Considerations:06429::\"As documented above in MDM\"}    ED Course:       Disposition   {ED Disposition:57776}    Procedures   Procedures    {ED Provider Level (Optional):34175}    Jose Wheeler MD  Emergency Medicine                                                    " abdomen pelvis without IV contrast with reformatted images lumbar spine.  These images were read by radiologist I also reviewed the independently pulm report given by the radiologist.  Patient urinalysis showed no evidence of UTI.  Suspect subacute blood noted acute on noted.  CT abdomen pelvis showed no obstructive uropathy patient was noted to have lumbar spine degenerative joint disease no acute fracture or subluxation.  CT abdomen however showed findings suspicious for acute diverticulitis.  White count is normal and no abdominal pain nausea vomiting diarrhea.  Advised if any fever chills or new symptom or worsening symptoms 20 of her primary care physician discharged home after treatment in the ER.  No clinical indication for hospitalization or transfer.      Differential diagnoses considered include but are not limited to: Lumbosacral spine pain lumbar artery disease degenerative joint disease fracture subluxation CT findings suspicious for mild diverticulitis.  No abdominal tenderness.  Close follow-up was recommended.    Social Determinants of Health which Significantly Impact Care: None     EKG Independent Interpretation: EKG not obtained    Independent Result Review and Interpretation: Imaging studies CT of the abdomen pelvis #6 spinal CT read by radiologist I also reviewed the independently.  Final report given by the radiologist.  Urinalysis and other labs are drawn.    Chronic conditions affecting the patient's care: Chronic back pain    The patient was discussed with the following consultants/services: None    Care Considerations: Care coordination as documented, see MDM for details    ED Course:  ED Course as of 07/18/25 0736   Wed Jun 25, 2025   1519 CT abdomen pelvis wo IV contrast [SP]      ED Course User Index  [SP] Jose Wheeler MD         Diagnoses as of 07/18/25 0736   Low back pain with left-sided sciatica, unspecified back pain laterality, unspecified chronicity   History of spinal stenosis    Rectal lesion   Acute diverticulitis       Disposition   As a result of the work-up, the patient was discharged home.  she was informed of her diagnosis and instructed to come back with any concerns or worsening of condition.  she and was agreeable to the plan as discussed above.  she was given the opportunity to ask questions.  All of the patient's questions were answered.    Procedures   Procedures        Jose Wheeler MD  Emergency Medicine                                                       Jose Wheeler MD  07/18/25 0744       Jose Wheeler MD  07/20/25 0506

## 2025-06-26 LAB — HOLD SPECIMEN: NORMAL

## 2025-06-30 ENCOUNTER — APPOINTMENT (OUTPATIENT)
Facility: HOSPITAL | Age: 60
End: 2025-06-30
Payer: COMMERCIAL

## 2025-07-02 ENCOUNTER — OFFICE VISIT (OUTPATIENT)
Facility: HOSPITAL | Age: 60
End: 2025-07-02
Payer: COMMERCIAL

## 2025-07-02 VITALS
WEIGHT: 230 LBS | TEMPERATURE: 97.5 F | DIASTOLIC BLOOD PRESSURE: 90 MMHG | OXYGEN SATURATION: 99 % | RESPIRATION RATE: 16 BRPM | BODY MASS INDEX: 36.96 KG/M2 | SYSTOLIC BLOOD PRESSURE: 149 MMHG | HEIGHT: 66 IN | HEART RATE: 86 BPM

## 2025-07-02 DIAGNOSIS — Z12.11 ENCOUNTER FOR SCREENING COLONOSCOPY: Primary | ICD-10-CM

## 2025-07-02 DIAGNOSIS — K80.20 CALCULUS OF GALLBLADDER WITHOUT CHOLECYSTITIS WITHOUT OBSTRUCTION: ICD-10-CM

## 2025-07-02 DIAGNOSIS — K62.9 RECTAL LESION: ICD-10-CM

## 2025-07-02 DIAGNOSIS — K57.92 ACUTE DIVERTICULITIS: Primary | ICD-10-CM

## 2025-07-02 PROCEDURE — 99203 OFFICE O/P NEW LOW 30 MIN: CPT

## 2025-07-02 PROCEDURE — 1036F TOBACCO NON-USER: CPT

## 2025-07-02 PROCEDURE — 3008F BODY MASS INDEX DOCD: CPT

## 2025-07-02 PROCEDURE — 99213 OFFICE O/P EST LOW 20 MIN: CPT

## 2025-07-02 ASSESSMENT — ENCOUNTER SYMPTOMS
DIAPHORESIS: 0
NEUROLOGICAL NEGATIVE: 1
FEVER: 0
ABDOMINAL DISTENTION: 0
CARDIOVASCULAR NEGATIVE: 1
NAUSEA: 0
MUSCULOSKELETAL NEGATIVE: 1
CONSTIPATION: 1
PSYCHIATRIC NEGATIVE: 1
CHILLS: 0
VOMITING: 0
FATIGUE: 0
BLOOD IN STOOL: 0
RESPIRATORY NEGATIVE: 1
ABDOMINAL PAIN: 0
WOUND: 0
DIARRHEA: 0

## 2025-07-02 ASSESSMENT — PAIN SCALES - GENERAL: PAINLEVEL_OUTOF10: 1

## 2025-07-02 NOTE — PATIENT INSTRUCTIONS
Colonoscopy scheduled for August 14th with Dr. Dyer. Contact office or mychart if RUQ or LLQ pain, or inability to tolerate solid foods. Return to ED if severe pain, fever, chills, unable to have bowel movement.

## 2025-07-02 NOTE — PROGRESS NOTES
"General Surgery History and Physical    Referring Provider:  Jose Wheeler MD     History Of Present Illness  Gabriela Sanford is a 60 y.o. female presenting with rectal lesion and acute diverticulitis.    Seen in [place] ED on 6/25 for lower back pain. CT abd/pelvis in ED showed: distal colon diverticulosis and subtle stranding near junction descending/sigmoid colon reflect mild acute diverticulitis, thickening right lateral rectal wall, and multiple large cholelithiasis without thickening.    She started experiencing LBP on 6/25 that prompted ED visit. She has history of chronic lower back pain for which she sees a chiropractor. Admits to experiencing mild abdominal pain while same day of ED visit. Occasional episodes of bilateral lower abdominal pain that she contributed to her history of uterine fibroids. Reports fibroids has since resolved but still experiencing the mild abdominal pain episodes. Her abdominal pain mildly worsened with solid foods last week. This has since resolved and she is on normal diet now. Admits she felt better after Levaquin 7 day course. Last dose is later today. Today having normal BM, no blood in stool, denies any abdominal pain.    Admits to chronic constipation with straining since birth of her daughter. She has BM every day but reports it is \"very large amount of stool\". She takes psyllium fiber cookies which help alleviate her straining. Denies hx of colonoscopy. Denies family hx of colon cancer, blood in stool, n/v/d. Hx of hemorrhoid banding. Hx of large gallstones on previous CT from 2016. States occasional RUQ pain with heavy meal. Currently diet controlled. Reports she eats a \"clean diet\" with little fatty food intake. She is from [place] and recently started going to doctors for her health.     Past Medical History  She has no past medical history on file.    Past Surgical History  She has a past surgical history that includes Knee arthroscopy w/ arthrotomy (Left).   "   Allergies  Ampicillin, Codeine, Formaldehyde, Latex, Penicillins, and Cortisone    Medications:  Current Outpatient Medications   Medication Instructions    acetaminophen (Tylenol 8 HOUR) 650 mg ER tablet 1 tablet, Every 6 hours PRN    aspirin 325 mg tablet take 1 tablet by mouth everyday    b complex 0.4 mg tablet 1 tablet, Daily    cholecalciferol (Vitamin D-3) 50 mcg (2,000 unit) capsule 2 capsules, Daily    cholecalciferol, vitamin D3, (VITAMIN D3 ORAL) 1 tablet, Daily    ferrous sulfate 325 mg, Daily with breakfast    ibuprofen (Motrin) capsule 2 capsules, Every 8 hours PRN    levoFLOXacin (LEVAQUIN) 500 mg, oral, Daily    levothyroxine (SYNTHROID, LEVOXYL) 25 mcg, oral, Daily, Take on an empty stomach at the same time each day, either 30 to 60 minutes prior to breakfast    medical cannabis 1 each, Daily    pantoprazole (PROTONIX) 40 mg, oral, Daily before breakfast, Swallow whole. Do not crush, chew or split.    sennosides (Senokot Extra Strength) 17.2 mg tablet 1 tablet, Daily PRN    sucralfate (CARAFATE) 1 g, oral, 4 times daily with meals and nightly        Social History  She reports that she quit smoking about 29 years ago. Her smoking use included cigarettes. She started smoking about 44 years ago. She has a 7.5 pack-year smoking history. She has never used smokeless tobacco. She reports that she does not currently use alcohol. She reports current drug use. Drug: Other.    Family History  Family History[1]    Review of Systems   Constitutional:  Negative for chills, diaphoresis, fatigue and fever.   HENT: Negative.     Respiratory: Negative.     Cardiovascular: Negative.    Gastrointestinal:  Positive for constipation. Negative for abdominal distention, abdominal pain, blood in stool, diarrhea, nausea and vomiting.   Musculoskeletal: Negative.    Skin:  Negative for pallor, rash and wound.   Neurological: Negative.    Psychiatric/Behavioral: Negative.          Physical Exam  Constitutional:        General: She is not in acute distress.     Appearance: Normal appearance. She is normal weight. She is not ill-appearing.   HENT:      Head: Normocephalic.   Eyes:      General: No scleral icterus.     Conjunctiva/sclera: Conjunctivae normal.      Pupils: Pupils are equal, round, and reactive to light.   Cardiovascular:      Rate and Rhythm: Normal rate and regular rhythm.      Pulses: Normal pulses.      Heart sounds: Normal heart sounds.   Pulmonary:      Effort: Pulmonary effort is normal. No respiratory distress.      Breath sounds: Normal breath sounds. No wheezing.   Abdominal:      General: Abdomen is flat. Bowel sounds are normal. There is no distension.      Palpations: Abdomen is soft. There is no mass.      Tenderness: There is abdominal tenderness in the right upper quadrant.      Hernia: No hernia is present.      Comments: Mild RUQ tenderness to palpation. No tenderness to LLQ or RLQ.   Musculoskeletal:         General: Normal range of motion.      Cervical back: Normal range of motion and neck supple.   Skin:     General: Skin is warm and dry.   Neurological:      General: No focal deficit present.      Mental Status: She is alert and oriented to person, place, and time.   Psychiatric:         Mood and Affect: Mood normal.         Behavior: Behavior normal.          Last Recorded Vitals  Vitals:    07/02/25 1048   BP: 149/90   Pulse: 86   Resp: 16   Temp: 36.4 °C (97.5 °F)   SpO2: 99%     Body mass index is 37.12 kg/m².    Labs  None.    Imaging:  CT ABDOMEN PELVIS WO IV CONTRAST; CT LUMBAR SPINE RETROSPECTIVE  RECONSTRUCTION PROTOCOL;  6/25/2025 1:07 pm; 6/25/2025 1:08 pm  FINDINGS:  Solid organ and vessel evaluation limited without IV contrast.      ABDOMINAL ORGANS:      LIVER: No focal lesion within limits of unenhanced exam.      GALL BLADDER AND BILIARY TREE: Multiple large rim calcified and or  gas containing gallstones, 1 near the neck. No gallbladder wall  thickening or pericholecystic  stranding.      SPLEEN: No focal lesion within limits of unenhanced exam. Small  isodense presumed splenule adjacent to the inferior pole.      PANCREAS: No focal lesion. Punctate hyperdensity near the head image  65/174.      ADRENALS: 1.3 cm hypodense nodule in the medial limb of the left  adrenal gland similar to the previous exam.      KIDNEYS AND URETERS: Ovoid lucencies in the central left kidney new  since the previous exam. Unremarkable right kidney. Distal ureters  partially obscured however no gross calcified renal or ureteral  calculus.      BOWEL: Small hiatal hernia. No abnormally dilated large or small  bowel loops. Dots of air in nondilated infracecal appendix. Proximal  colon fecal residue. Distal colon diverticulosis. Questionable trace  fat stranding near the junction of the distal descending and sigmoid  colon. Distended fecal filled rectum. Asymmetric slight thickening of  the right lateral rectal wall.      PERITONEUM, RETROPERITONEUM, NODES: Cul-de-sac is partially obscured.  No significant free fluid as visualized. No free intraperitoneal air.  No significant retroperitoneal lymphadenopathy within limits of  unenhanced exam.      VESSELS:  Lack of IV contrast precludes vascular luminal assessment.  The abdominal aorta is normal in caliber. Scattered atherosclerotic  calcifications.      PELVIS: Artifact from right hip prosthesis limits evaluation. Urinary  bladder and uterus are grossly normal in contour. Punctate  calcification in the uterine fundus. No obvious adnexal mass.      ABDOMINAL WALL: No sizable abdominal wall hernia.      BONES: Status post right hip arthroplasty. Degenerative changes of  the lumbar spine and left hip.      LOWER CHEST: Faint linear densities in the left lung base. No  confluent airspace opacity or pleural effusion in the included areas.          LUMBAR SPINE:      ALIGNMENT: Mild levoscoliosis. No significant spondylolisthesis.      VERTEBRAE AND DISC SPACES: No  lumbar vertebral compression deformity  or acute displaced fracture. Diffuse osteopenia. Multilevel  degenerative endplate spurring and smooth endplate  depression/Schmorl's nodes. Moderate L5-S1 intervertebral disc space  narrowing. Bilateral facet joint degenerative changes at L4-5 and  L5-S1 in particular. Mild diffuse disc bulge in combination with  facet joint and ligamentum flavum hypertrophy results in central  canal and lateral recess narrowing at L2-3 and L3-4. Right paramedian  disc bulge at L4-5 results in lateral recess and neural foraminal  narrowing.    IMPRESSION:  Distal colon diverticulosis and questionable subtle stranding near  the junction of the descending and sigmoid colon which could reflect  mild acute diverticulitis.      Mild left hydronephrosis versus parapelvic cysts. No obvious  urolithiasis.      Colorectal fecal retention.      Slight thickening of the right lateral rectal wall. Further  evaluation with direct visualization/colonoscopy suggested.      Cholelithiasis.      Tiny density in the region of the pancreatic head, possible  parenchymal calcification or tiny biliary calculus.      Small hiatal hernia.      No lumbar vertebral compression deformity, displacement or acute  displaced fracture. Scoliosis and multilevel productive/degenerative  changes with multilevel central canal and neural foraminal narrowing  most prominent at L4-5. More complete evaluation with MRI may be  helpful as clinically warranted.      Other findings as described above.    Assessment & Plan  Acute diverticulitis    Rectal lesion    Calculus of gallbladder without cholecystitis without obstruction    Informed patient of the CT results of possible mild acute diverticulitis, rectal thickening, and cholelithiasis. She is tolerating normal solid diet without pain and normal BM. Colonoscopy order in 6 weeks tentative scheduled for August 14 with Dr. Dyer. Contact office or return to ED if diverticulitis returns  or inability to tolerate solid diet without pain.  Cholelithiasis pain is currently diet controlled. Informed that the gallstones are large and could prompt surgical intervention in future. Instructed to contact office if RUQ pain worsens or return to ED.   She expressed understanding and agrees to plan. Will follow up after colonoscopy completed.    I spent 30 minutes in the professional and overall care of this patient.      Rayray Guadalupe PA-C  General Surgery/Surgical Medicine  [phone]  11:30 AM   07/02/25         [1] No family history on file.

## 2025-07-03 PROCEDURE — RXMED WILLOW AMBULATORY MEDICATION CHARGE

## 2025-07-07 ENCOUNTER — PHARMACY VISIT (OUTPATIENT)
Dept: PHARMACY | Facility: CLINIC | Age: 60
End: 2025-07-07
Payer: COMMERCIAL

## 2025-07-14 DIAGNOSIS — R12 HEARTBURN: ICD-10-CM

## 2025-07-14 DIAGNOSIS — R07.9 CHEST PAIN, UNSPECIFIED TYPE: ICD-10-CM

## 2025-07-14 RX ORDER — PANTOPRAZOLE SODIUM 40 MG/1
40 TABLET, DELAYED RELEASE ORAL
Qty: 90 TABLET | Refills: 1 | Status: SHIPPED | OUTPATIENT
Start: 2025-07-14

## 2025-07-15 ENCOUNTER — APPOINTMENT (OUTPATIENT)
Dept: ORTHOPEDIC SURGERY | Facility: CLINIC | Age: 60
End: 2025-07-15
Payer: COMMERCIAL

## 2025-07-23 ENCOUNTER — PRE-ADMISSION TESTING (OUTPATIENT)
Dept: PREADMISSION TESTING | Facility: HOSPITAL | Age: 60
End: 2025-07-23
Payer: COMMERCIAL

## 2025-07-23 PROBLEM — E66.812 CLASS 2 OBESITY IN ADULT: Status: ACTIVE | Noted: 2025-07-23

## 2025-07-23 RX ORDER — NAPROXEN 250 MG/1
250 TABLET ORAL 2 TIMES DAILY PRN
COMMUNITY

## 2025-07-23 ASSESSMENT — DUKE ACTIVITY SCORE INDEX (DASI)
DASI METS SCORE: 7.6
TOTAL_SCORE: 39.45
CAN YOU TAKE CARE OF YOURSELF (EAT, DRESS, BATHE, OR USE TOILET): YES
CAN YOU CLIMB A FLIGHT OF STAIRS OR WALK UP A HILL: YES
CAN YOU DO HEAVY WORK AROUND THE HOUSE LIKE SCRUBBING FLOORS OR LIFTING AND MOVING HEAVY FURNITURE: YES
CAN YOU DO MODERATE WORK AROUND THE HOUSE LIKE VACUUMING, SWEEPING FLOORS OR CARRYING GROCERIES: YES
CAN YOU WALK A BLOCK OR TWO ON LEVEL GROUND: YES
CAN YOU DO LIGHT WORK AROUND THE HOUSE LIKE DUSTING OR WASHING DISHES: YES
CAN YOU HAVE SEXUAL RELATIONS: NO
CAN YOU WALK INDOORS, SUCH AS AROUND YOUR HOUSE: YES
CAN YOU PARTICIPATE IN MODERATE RECREATIONAL ACTIVITIES LIKE GOLF, BOWLING, DANCING, DOUBLES TENNIS OR THROWING A BASEBALL OR FOOTBALL: NO
CAN YOU RUN A SHORT DISTANCE: YES
CAN YOU DO YARD WORK LIKE RAKING LEAVES, WEEDING OR PUSHING A MOWER: YES
CAN YOU PARTICIPATE IN STRENOUS SPORTS LIKE SWIMMING, SINGLES TENNIS, FOOTBALL, BASKETBALL, OR SKIING: NO

## 2025-07-29 ENCOUNTER — APPOINTMENT (OUTPATIENT)
Dept: ORTHOPEDIC SURGERY | Facility: CLINIC | Age: 60
End: 2025-07-29
Payer: COMMERCIAL

## 2025-08-07 DIAGNOSIS — R22.41 LOCALIZED SWELLING OF RIGHT LOWER LEG: Primary | ICD-10-CM

## 2025-08-07 NOTE — PROGRESS NOTES
Order for right leg US r/o DVT.   Had pain 7/25/25 with swelling and bruising. Was suggested she go to the ED. Declined, still having issues, unable to get into office. or

## 2025-08-08 ENCOUNTER — HOSPITAL ENCOUNTER (OUTPATIENT)
Dept: RADIOLOGY | Facility: HOSPITAL | Age: 60
Discharge: HOME | End: 2025-08-08
Payer: COMMERCIAL

## 2025-08-08 DIAGNOSIS — R22.41 LOCALIZED SWELLING OF RIGHT LOWER LEG: ICD-10-CM

## 2025-08-08 PROCEDURE — 93970 EXTREMITY STUDY: CPT

## 2025-08-11 ENCOUNTER — APPOINTMENT (OUTPATIENT)
Dept: RADIOLOGY | Facility: HOSPITAL | Age: 60
End: 2025-08-11
Payer: COMMERCIAL

## 2025-08-14 ENCOUNTER — APPOINTMENT (OUTPATIENT)
Dept: GASTROENTEROLOGY | Facility: HOSPITAL | Age: 60
End: 2025-08-14
Payer: COMMERCIAL

## 2025-08-19 ENCOUNTER — APPOINTMENT (OUTPATIENT)
Dept: ORTHOPEDIC SURGERY | Facility: CLINIC | Age: 60
End: 2025-08-19
Payer: COMMERCIAL

## 2025-08-19 DIAGNOSIS — M62.830 SPASM OF MUSCLE OF LOWER BACK: Primary | ICD-10-CM

## 2025-08-19 DIAGNOSIS — M47.816 LUMBAR SPONDYLOSIS: ICD-10-CM

## 2025-08-19 PROCEDURE — 99212 OFFICE O/P EST SF 10 MIN: CPT

## 2025-08-19 PROCEDURE — 99203 OFFICE O/P NEW LOW 30 MIN: CPT | Performed by: ORTHOPAEDIC SURGERY

## 2025-08-19 ASSESSMENT — PAIN - FUNCTIONAL ASSESSMENT: PAIN_FUNCTIONAL_ASSESSMENT: NO/DENIES PAIN

## 2025-10-10 ENCOUNTER — APPOINTMENT (OUTPATIENT)
Dept: PRIMARY CARE | Facility: CLINIC | Age: 60
End: 2025-10-10
Payer: COMMERCIAL

## 2025-10-14 ENCOUNTER — APPOINTMENT (OUTPATIENT)
Dept: PRIMARY CARE | Facility: CLINIC | Age: 60
End: 2025-10-14
Payer: COMMERCIAL

## (undated) DEVICE — STRIP, SKIN CLOSURE, STERI STRIP, REINFORCED, 0.5 X 4 IN

## (undated) DEVICE — IRRIGATION SYSTEM, WOUND, PULSAVAC PLUS

## (undated) DEVICE — HOOD, SURGICAL, FLYTE HYBRID

## (undated) DEVICE — Device

## (undated) DEVICE — GLOVE, SURGICAL, PROTEXIS PI , 8.0, PF, LF

## (undated) DEVICE — SUTURE, ETHIBOND, 5, 30 IN, V40, MULTIPACK, GREEN

## (undated) DEVICE — TIP, SUCTION, YANKAUER, FLEXIBLE

## (undated) DEVICE — BLADE, SAW, SAGITTAL, 25 X 73 X 1.24MM, SS, STERILE

## (undated) DEVICE — SUTURE, VICRYL, 2-0, 18 IN CP-2, UNDYED

## (undated) DEVICE — GLOVE, SURGICAL, PROTEXIS PI ORTHO, 8.0, PF, LF

## (undated) DEVICE — SYRINGE, 50 CC, LUER LOCK

## (undated) DEVICE — SOLUTION, POVIDONE IODINE 10%, 0.75 OZ, NS

## (undated) DEVICE — DRESSING, MEPILEX BORDER, POST-OP AG, 4 X 10 IN

## (undated) DEVICE — DRAPE, INSTRUMENT, W/POUCH, STERI DRAPE, 7 X 11 IN, DISPOSABLE, STERILE

## (undated) DEVICE — CATHETER TRAY, SURESTEP, 14FR, PRECONNECTED DRAIN BAG, PVC

## (undated) DEVICE — SEALER, BIPOLAR, AQUA MANTYS 6.0

## (undated) DEVICE — BANDAGE, COFLEX, 6 X 5 YDS, TAN, STERILE, LF

## (undated) DEVICE — SUTURE, ETHIBOND, 5 LR 20IN

## (undated) DEVICE — SUTURE, VICRYL, 1, 36 IN, CT-1, UNDYED

## (undated) DEVICE — GLOVE, SURGICAL, PROTEXIS PI , 6.5, PF, LF